# Patient Record
Sex: FEMALE | Race: WHITE
[De-identification: names, ages, dates, MRNs, and addresses within clinical notes are randomized per-mention and may not be internally consistent; named-entity substitution may affect disease eponyms.]

---

## 2019-10-03 ENCOUNTER — HOSPITAL ENCOUNTER (INPATIENT)
Dept: HOSPITAL 7 - FB.ED | Age: 84
LOS: 5 days | Discharge: NURSING, REHAB OR LONG-TERM CARE | DRG: 871 | End: 2019-10-08
Attending: FAMILY MEDICINE | Admitting: FAMILY MEDICINE
Payer: MEDICARE

## 2019-10-03 DIAGNOSIS — R55: ICD-10-CM

## 2019-10-03 DIAGNOSIS — Z95.2: ICD-10-CM

## 2019-10-03 DIAGNOSIS — N17.9: ICD-10-CM

## 2019-10-03 DIAGNOSIS — I38: ICD-10-CM

## 2019-10-03 DIAGNOSIS — M19.90: ICD-10-CM

## 2019-10-03 DIAGNOSIS — Z95.1: ICD-10-CM

## 2019-10-03 DIAGNOSIS — R79.89: ICD-10-CM

## 2019-10-03 DIAGNOSIS — I25.810: ICD-10-CM

## 2019-10-03 DIAGNOSIS — G47.33: ICD-10-CM

## 2019-10-03 DIAGNOSIS — M25.521: ICD-10-CM

## 2019-10-03 DIAGNOSIS — I48.0: ICD-10-CM

## 2019-10-03 DIAGNOSIS — B95.61: ICD-10-CM

## 2019-10-03 DIAGNOSIS — Z96.651: ICD-10-CM

## 2019-10-03 DIAGNOSIS — J18.9: ICD-10-CM

## 2019-10-03 DIAGNOSIS — I48.91: ICD-10-CM

## 2019-10-03 DIAGNOSIS — Z98.49: ICD-10-CM

## 2019-10-03 DIAGNOSIS — A41.9: Primary | ICD-10-CM

## 2019-10-03 DIAGNOSIS — I50.21: ICD-10-CM

## 2019-10-03 DIAGNOSIS — M25.562: ICD-10-CM

## 2019-10-03 DIAGNOSIS — Z79.01: ICD-10-CM

## 2019-10-03 DIAGNOSIS — Z79.82: ICD-10-CM

## 2019-10-03 DIAGNOSIS — Z79.899: ICD-10-CM

## 2019-10-03 DIAGNOSIS — I11.0: ICD-10-CM

## 2019-10-03 PROCEDURE — 83880 ASSAY OF NATRIURETIC PEPTIDE: CPT

## 2019-10-03 PROCEDURE — 80053 COMPREHEN METABOLIC PANEL: CPT

## 2019-10-03 PROCEDURE — 71045 X-RAY EXAM CHEST 1 VIEW: CPT

## 2019-10-03 PROCEDURE — 83605 ASSAY OF LACTIC ACID: CPT

## 2019-10-03 PROCEDURE — 93005 ELECTROCARDIOGRAM TRACING: CPT

## 2019-10-03 PROCEDURE — 86140 C-REACTIVE PROTEIN: CPT

## 2019-10-03 PROCEDURE — 73080 X-RAY EXAM OF ELBOW: CPT

## 2019-10-03 PROCEDURE — 36415 COLL VENOUS BLD VENIPUNCTURE: CPT

## 2019-10-03 PROCEDURE — 96361 HYDRATE IV INFUSION ADD-ON: CPT

## 2019-10-03 PROCEDURE — 85025 COMPLETE CBC W/AUTO DIFF WBC: CPT

## 2019-10-03 PROCEDURE — 84484 ASSAY OF TROPONIN QUANT: CPT

## 2019-10-03 PROCEDURE — 85610 PROTHROMBIN TIME: CPT

## 2019-10-03 PROCEDURE — 71046 X-RAY EXAM CHEST 2 VIEWS: CPT

## 2019-10-03 PROCEDURE — 99285 EMERGENCY DEPT VISIT HI MDM: CPT

## 2019-10-03 PROCEDURE — 70450 CT HEAD/BRAIN W/O DYE: CPT

## 2019-10-03 NOTE — EDM.PDOC
ED HPI GENERAL MEDICAL PROBLEM





- General


Chief Complaint: General


Stated Complaint: FOUND ON FLOOR


Time Seen by Provider: 10/03/19 16:00


Source of Information: Reports: Patient, Old Records


History Limitations: Reports: No Limitations





- History of Present Illness


INITIAL COMMENTS - FREE TEXT/NARRATIVE: 





Zara comes into [facility] ED by EMS after being found on the bathroom floor by 

relative about an hour ago. She missed a cardiology appt at 10:30 am today. 

Zara is uncertain about how events occurred except to say that she remembers 

getting out of bed this am and does not believe she fell. She does not endorse 

any headache, lt headiness, or chest pain. She does have a painful R elbow 

present the past few days, and was seen recently for L leg pain, doppler venous 

study negative for DVT. She was prescribed Tramadol 50 mg for relief, but 

daughter suspects she took 2 tabs for pain relief. She lives alone in [facility]. 





- Related Data


 Allergies











Allergy/AdvReac Type Severity Reaction Status Date / Time


 


No Known Allergies Allergy   Verified 09/19/13 09:14











Home Meds: 


 Home Meds





Aspirin [Adult Low Dose Aspirin EC] 81 mg PO BEDTIME 06/29/14 [History]


Ezetimibe [Zetia] 10 mg PO DAILY 06/29/14 [History]


PARoxetine [Paxil] 40 mg PO DAILY 06/29/14 [History]


atorvaSTATin [Lipitor] 60 mg PO BEDTIME 06/29/14 [History]


Furosemide [Lasix] 40 mg PO DAILY 07/01/14 [History]


Warfarin Sodium [Jantoven] 1 mg PO DAILY 10/03/19 [History]


hydroCHLOROthiazide [Hydrochlorothiazide] 10 mg PO DAILY 10/03/19 [History]


traMADol [Ultram] 50 mg PO Q8HR PRN 10/03/19 [History]











ED ROS GENERAL





- Review of Systems


Review Of Systems: See Below


Constitutional: Reports: No Symptoms


HEENT: Reports: No Symptoms


Respiratory: Reports: No Symptoms


Cardiovascular: Reports: No Symptoms


Endocrine: Reports: No Symptoms


GI/Abdominal: Reports: No Symptoms


: Reports: No Symptoms


Musculoskeletal: Reports: Arm Pain (R elbow), Leg Pain (L calf and improved on 

pain meds)


Skin: Reports: No Symptoms


Neurological: Reports: Syncope (PMH of sycope)


Psychiatric: Reports: No Symptoms


Hematologic/Lymphatic: Reports: No Symptoms


Immunologic: Reports: No Symptoms





ED EXAM, GENERAL





- Physical Exam


Exam: See Below


Exam Limited By: No Limitations


General Appearance: Alert, WD/WN, No Apparent Distress


Eye Exam: Bilateral Eye: EOMI, Normal Inspection, PERRL


Ears: Normal External Exam


Nose: Normal Inspection


Throat/Mouth: Normal Inspection, Normal Oropharynx, Normal Voice


Head: Atraumatic, Normocephalic


Neck: Normal Inspection, Supple, Non-Tender, Full Range of Motion


Respiratory/Chest: No Respiratory Distress, No Accessory Muscle Use, Chest Non-

Tender, Decreased Breath Sounds (bases), Crackles


Cardiovascular: Normal Peripheral Pulses, Irregularly Irregular, Other (opening 

'click' aortic valve)


GI/Abdominal: Normal Bowel Sounds, Soft, Non-Tender, No Organomegaly, No 

Distention, No Mass


 (Female) Exam: Deferred


Rectal (Female) Exam: Deferred


Back Exam: Normal Inspection, Full Range of Motion


Extremities: Arm Pain (R elbow: tenderness of distal biceps extending to 

insertion with forearm), Leg Pain (L leg, mild), Limited Range of Motion (R 

forearm)


Neurological: Alert, Oriented, CN II-XII Intact, Normal Cognition, No Motor/

Sensory Deficits


Psychiatric: Normal Affect, Normal Mood


Skin Exam: Warm, Dry, Intact, Normal Color, No Rash, Other (edema)


Lymphatic: No Adenopathy





Course





- Vital Signs


Text/Narrative:: 





Following assessment in the ED, I started an IV LUE TKO, and provided 

supplemental 02 for SOB. A screening ekg noted AF, old, no other changes 

apparent.  The CBC noted: Hgb 11.0 gm, WBC 11,900, plts normal; L shift noted; 

CMP noted Cr 2.9, BUN 80, BNP 12,007; chest x ray noting extensive R middle 

lobe infiltrates; some cardiomegaly; Head CT non contrast: stable, no new 

findings; R elbow: no fx seen; a pneumonia is suspected, with pre-renal 

azotemia. Case discussed with daughter, and she will be admitted as IP. 


Last Recorded V/S: 


 Last Vital Signs











Temp  36.7 C   10/03/19 15:41


 


Pulse  111 H  10/03/19 15:41


 


Resp  23 H  10/03/19 15:41


 


BP  103/65   10/03/19 15:41


 


Pulse Ox  90 L  10/03/19 15:41














- Orders/Labs/Meds


Orders: 


 Active Orders 24 hr











 Category Date Time Status


 


 Chest 1V Frontal [CR] Stat Exams  10/03/19 16:13 Taken


 


 Chest 2V [CR] Stat Exams  10/03/19 17:09 Taken


 


 Elbow Min 3V Rt [CR] Stat Exams  10/03/19 16:13 Taken


 


 Head wo Cont [CT] Urgent Exams  10/03/19 16:25 Taken


 


 CRP [C-REACTIVE PROTEIN] [CHEM] Stat Lab  10/03/19 16:15 Received


 


 LACTIC ACID [CHEM] Stat Lab  10/03/19 16:15 Received


 


 UA W/MICROSCOPIC [URIN] Stat Lab  10/03/19 16:13 Ordered


 


 Sodium Chloride 0.9% [Saline Flush] Med  10/03/19 16:17 Active





 10 ml FLUSH ASDIRECTED PRN   


 


 Peripheral IV Insertion Adult [OM.PC] Routine Oth  10/03/19 16:17 Ordered


 


 EKG 12 Lead [EK] Routine Ther  10/03/19 16:13 Ordered








 Medication Orders





Sodium Chloride (Saline Flush)  10 ml FLUSH ASDIRECTED PRN


   PRN Reason: Keep Vein Open








Labs: 


 Laboratory Tests











  10/03/19 10/03/19 10/03/19 Range/Units





  16:15 16:15 16:15 


 


WBC  11.9    (4.5-12.0)  X10-3/uL


 


RBC  3.59    (3.23-5.20)  x10(6)uL


 


Hgb  11.0 L    (11.5-15.5)  g/dL


 


Hct  33.2    (30.0-51.3)  %


 


MCV  92.5    (80-96)  fL


 


MCH  30.7    (27.7-33.6)  pg


 


MCHC  33.2    (32.2-35.4)  g/dL


 


RDW  15.2    (11.5-15.5)  %


 


Plt Count  254    (125-369)  X10(3)uL


 


MPV  8.9    (7.4-10.4)  fL


 


Add Manual Diff  Yes    


 


Neutrophils % (Manual)  74    (46-82)  %


 


Band Neutrophils %  9 H    (0-6)  %


 


Lymphocytes % (Manual)  12 L    (13-37)  %


 


Monocytes % (Manual)  5    (4-12)  %


 


PT   30.4 H   (8.7-11.1)  


 


INR   3.17 H   (0.89-1.13)  


 


Sodium    136  (135-145)  mmol/L


 


Potassium    4.0  (3.5-5.3)  mmol/L


 


Chloride    98 L  (100-110)  mmol/L


 


Carbon Dioxide    25  (21-32)  mmol/L


 


BUN    80 H  (7-18)  mg/dL


 


Creatinine    2.9 H*  (0.55-1.02)  mg/dL


 


Est Cr Clr Drug Dosing    TNP  


 


Estimated GFR (MDRD)    16 L  (>60)  


 


BUN/Creatinine Ratio    27.6 H  (9-20)  


 


Glucose    111  ()  mg/dL


 


Calcium    9.7  (8.6-10.2)  mg/dL


 


Total Bilirubin    0.6  (0.1-1.3)  mg/dL


 


AST    122 H  (5-25)  IU/L


 


ALT    40 H  (12-36)  U/L


 


Alkaline Phosphatase    86  ()  IU/L


 


Troponin I     (<0.017-0.056)  ng/mL


 


NT-Pro-B Natriuret Pep     (<=450)  pg/mL


 


Total Protein    7.3  (6.0-8.0)  g/dL


 


Albumin    2.5 L  (3.2-4.6)  g/dL


 


Globulin    4.8  g/dL


 


Albumin/Globulin Ratio    0.5  














  10/03/19 10/03/19 Range/Units





  16:15 16:15 


 


WBC    (4.5-12.0)  X10-3/uL


 


RBC    (3.23-5.20)  x10(6)uL


 


Hgb    (11.5-15.5)  g/dL


 


Hct    (30.0-51.3)  %


 


MCV    (80-96)  fL


 


MCH    (27.7-33.6)  pg


 


MCHC    (32.2-35.4)  g/dL


 


RDW    (11.5-15.5)  %


 


Plt Count    (125-369)  X10(3)uL


 


MPV    (7.4-10.4)  fL


 


Add Manual Diff    


 


Neutrophils % (Manual)    (46-82)  %


 


Band Neutrophils %    (0-6)  %


 


Lymphocytes % (Manual)    (13-37)  %


 


Monocytes % (Manual)    (4-12)  %


 


PT    (8.7-11.1)  


 


INR    (0.89-1.13)  


 


Sodium    (135-145)  mmol/L


 


Potassium    (3.5-5.3)  mmol/L


 


Chloride    (100-110)  mmol/L


 


Carbon Dioxide    (21-32)  mmol/L


 


BUN    (7-18)  mg/dL


 


Creatinine    (0.55-1.02)  mg/dL


 


Est Cr Clr Drug Dosing    


 


Estimated GFR (MDRD)    (>60)  


 


BUN/Creatinine Ratio    (9-20)  


 


Glucose    ()  mg/dL


 


Calcium    (8.6-10.2)  mg/dL


 


Total Bilirubin    (0.1-1.3)  mg/dL


 


AST    (5-25)  IU/L


 


ALT    (12-36)  U/L


 


Alkaline Phosphatase    ()  IU/L


 


Troponin I  0.034   (<0.017-0.056)  ng/mL


 


NT-Pro-B Natriuret Pep   67138 H*  (<=450)  pg/mL


 


Total Protein    (6.0-8.0)  g/dL


 


Albumin    (3.2-4.6)  g/dL


 


Globulin    g/dL


 


Albumin/Globulin Ratio    











Meds: 


Medications











Generic Name Dose Route Start Last Admin





  Trade Name Freq  PRN Reason Stop Dose Admin


 


Sodium Chloride  10 ml  10/03/19 16:17  





  Saline Flush  FLUSH   





  ASDIRECTED PRN   





  Keep Vein Open   





     





     





     














Discontinued Medications














Generic Name Dose Route Start Last Admin





  Trade Name Freq  PRN Reason Stop Dose Admin


 


Sodium Chloride  1,000 mls @ 500 mls/hr  10/03/19 16:30  10/03/19 16:00





  Normal Saline  IV   500 mls/hr





  ASDIRECTED GEETHA   Administration





     





     





     





     














Departure





- Departure


Time of Disposition: 17:42


Disposition: Admitted As Inpatient 68


Condition: Fair


Clinical Impression: 


 Pneumonia, Prerenal azotemia








- Discharge Information


*PRESCRIPTION DRUG MONITORING PROGRAM REVIEWED*: Not Applicable


*COPY OF PRESCRIPTION DRUG MONITORING REPORT IN PATIENT CHRISTINA: Not Applicable


Referrals: 


Khadijah Bianchi NP [Primary Care Provider] - 


Forms:  ED Department Discharge





- Problem List & Annotations


(1) Pneumonia


SNOMED Code(s): 065289546


   Code(s): J18.9 - PNEUMONIA, UNSPECIFIED ORGANISM   Status: Acute   Current 

Visit: Yes   Annotation/Comment:: Admit, start antibx, provide supplemental 02 

  





(2) Prerenal azotemia


SNOMED Code(s): 279573442


   Code(s): R79.89 - OTHER SPECIFIED ABNORMAL FINDINGS OF BLOOD CHEMISTRY   

Status: Acute   Current Visit: Yes   Annotation/Comment:: Rehydration with NS. 

   





- Problem List Review


Problem List Initiated/Reviewed/Updated: Yes





- My Orders


Last 24 Hours: 


My Active Orders





10/03/19 16:13


Chest 1V Frontal [CR] Stat 


Elbow Min 3V Rt [CR] Stat 


UA W/MICROSCOPIC [URIN] Stat 


EKG 12 Lead [EK] Routine 





10/03/19 16:15


CRP [C-REACTIVE PROTEIN] [CHEM] Stat 


LACTIC ACID [CHEM] Stat 





10/03/19 16:17


Sodium Chloride 0.9% [Saline Flush]   10 ml FLUSH ASDIRECTED PRN 


Peripheral IV Insertion Adult [OM.PC] Routine 





10/03/19 16:25


Head wo Cont [CT] Urgent 





10/03/19 17:09


Chest 2V [CR] Stat 














- Assessment/Plan


Last 24 Hours: 


My Active Orders





10/03/19 16:13


Chest 1V Frontal [CR] Stat 


Elbow Min 3V Rt [CR] Stat 


UA W/MICROSCOPIC [URIN] Stat 


EKG 12 Lead [EK] Routine 





10/03/19 16:15


CRP [C-REACTIVE PROTEIN] [CHEM] Stat 


LACTIC ACID [CHEM] Stat 





10/03/19 16:17


Sodium Chloride 0.9% [Saline Flush]   10 ml FLUSH ASDIRECTED PRN 


Peripheral IV Insertion Adult [OM.PC] Routine 





10/03/19 16:25


Head wo Cont [CT] Urgent 





10/03/19 17:09


Chest 2V [CR] Stat 











Plan: 





Hospitalist to see in the am.

## 2019-10-04 RX ADMIN — OMEGA-3 FATTY ACIDS CAP 1000 MG SCH GM: 1000 CAP at 09:54

## 2019-10-04 RX ADMIN — CARBOXYMETHYLCELLULOSE SODIUM SCH DROP: 10 GEL OPHTHALMIC at 21:28

## 2019-10-04 NOTE — CR
INDICATION:  Found on floor, question syncopal episode.



CHEST:  PA and lateral views of the chest were obtained 10/03/19 at 1702 hours 
and compared with a single view examination from 1631 hours and previous CT of 
the chest from 06/04/14. 



Consolidating infiltration is present at the middle lobe, blurring the left 
cardiac margin, with blunting of the costophrenic angle and posterior sulcus on 
the right.  This appearance is compatible with pneumonia and pleuritis but 
should be correlated clinically.



There is some linear atelectasis and possibly fibrosis at the left lung base 
with some minimal pleural reaction, which could be fibrotic in nature.



The heart was slightly enlarged with aortic valve replacement.  The aorta is 
tortuous and calcified in the arch and descending portion.  Median sternotomy 
with aortic valve replacement is again noted.



Overlying EKG leads are noted.



Diminished bone density is compatible with osteoporosis. 



AP diameter prominence, flattening of diaphragm leaves, and hyperaeration all 
suggest COPD.



IMPRESSION:

1.  Pneumonia and pleuritis right middle lobe, somewhat patchy consolidation is 
present.

2.  Minimal pneumonia and pleuritis at the left lung base versus fibrosis.

3.  ASHD, cardiomegaly, aortic valve replacement.

4.  Osteoporosis.

5.  COPD.



Report was given in person to Dr. Park immediately after the examination was 
completed on 10/03/19.

MTDD

## 2019-10-04 NOTE — CT
INDICATION:  Knee pain.  Surgery 1 1/2 years ago.



CT KNEES FOR LEFT KNEE:  Spiral 2.5 mm axial sections were obtained through the 
knees for the left knee.  Coronal and sagittal reconstructions were obtained, 10
/04/19 - no comparisons.  Total exam DLP = 1,654.46 mGy-cm.

 

A total knee arthroplasty is noted on the symptomatic left knee, which produces 
hard beam artifact, which does limit detail somewhat.  



The total knee arthroplasty does appear to be in good position and alignment 
without a definite complicating process, such as loosening.  However, there is 
evidence of a left knee joint effusion of small size, etiology indeterminate.  
This should be correlated clinically, as the possibility of septic arthritis 
cannot be excluded.



Degenerative changes are noted at the right knee joint with loss of joint space 
at the patellofemoral joint, hypertrophic changes, and some subchondral cystic 
changes noted at the femorotibial joint.



Incidentally noted were calcifications in the arteries posteriorly bilaterally.



IMPRESSION:

1.  TKA on the left appears to be in satisfactory position and alignment with a 
small knee joint effusion noted - no other definite complicating process 
suggested.  Study is somewhat limited by the hard beam artifact of the metallic 
components of the TKA.

2.  Osteoarthritis right knee joint.

3.  ASD.

MTDD

## 2019-10-04 NOTE — CR
INDICATION:  Found on floor, question syncopal episode.



RIGHT ELBOW:  Four images in three projections of the right elbow revealed mild 
osteoarthritis in the medial elbow joint compartment.



Slight demineralization may represent osteoporosis but should be correlated 
clinically.



A fracture, dislocation, joint effusion, or other significant bone or joint 
abnormality was not identified. 



IMPRESSION:

1.  Mild medial compartment osteoarthritis.

2.  Question minimal demineralization.

MTDD

## 2019-10-04 NOTE — CT
INDICATION:  Pain elbow/forearm.



CT RIGHT FOREARM:  Spiral 1.25 mm axial sections were obtained through the 
forearm times two, due to the first being inadequately positioned apparently 
due to patients inability to position satisfactorily.  Sagittal and coronal 
reconstructions were obtained, 10/04/19 - no comparisons.  Total exam DLP = 1,
851.52 mGy-cm.

 

Mild degenerative changes are noted at the medial elbow joint compartment and 
at the radiocarpal joint, mostly at the radial lunate joint.  



No evidence of an acute fracture or dislocation was identified.  



There appears to be an elbow joint effusion, which could be on the basis of 
soft tissue injury or occult fracture; however, no fracture site is seen at 
this time.  A followup study with plain films in 2-3 weeks may be helpful for 
further evaluation.  MRI of the elbow to evaluate for soft tissue injury may 
also be helpful, depending upon clinical correlation.



IMPRESSION:  No visualized fracture or dislocation.  However, there is evidence 
of an elbow joint effusion, which should be correlated clinically, as a soft 
tissue injury could be present.  MRI may be helpful in that regard, depending 
upon clinical necessity.

HIWOTD

## 2019-10-04 NOTE — CT
INDICATION:  Found on floor, question syncopal episode.



CT HEAD WITHOUT CONTRAST:  Spiral 3.75 mm axial sections were obtained through 
the brain 10/03/19 and compared with 06/29/14.  Total exam DLP = 1,373.85 mGy-
cm.



Calcifications are noted in the left vertebral and internal carotid arteries, 
as previously.



The orbits appear to be grossly intact.  



There is thickening of the linings of the maxillary antra, more prominently at 
the left than right, which could be on the basis of sinusitis.  An air fluid 
level in the right maxillary antrum raising question of acute maxillary 
sinusitis.  Frothy appearing fluid is suggested in the left maxillary sinus, 
also suggesting acute sinusitis.  



There is noted thickening of the linings of several ethmoidal air cells and 
right sphenoidal air cell, as well as at the bases of the frontal air cells, 
suggesting additional sinusitis.



The mastoid air cells appear to be fairly well-aerated.



Degenerative changes are noted at the atlantoodontoid joint.  There is a small 
bony fragment left lateral to the odontoid, which was present previously and 
could represent a tiny avulsion chip fracture fragment that did not unite from 
a previous injury off the atlas at the border of the atlantoodontoid joint. 



No cranial fracture site was identified.  There is again noted a low density - 
cystic appearing mass in the left posterior parietal bone.  This appears 
unchanged and is likely not of clinical significance.  



No shift of midline structures was identified.



Slightly progressive atrophy is suggested centrally and cortical.  



Probable lacunar infarct is noted at the genu of the internal capsule on the 
left, present previously, associated with some minimal calcification.



Slightly progressive white matter changes are noted, compatible with mild 
microvascular disease.  Other cause of leukoencephalopathy cannot be excluded.  



No finding to strongly suggest an acute intracranial abnormality was identified 
- no bleeding site or hematoma was seen.  



IMPRESSION:  

1.  No definite acute intracranial abnormality.

2.  Cerebrovascular disease with mild microvascular disease likely.

3.  Paranasal sinusitis suggested.  

4.  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 
stable.

5.  Mildly progressive generalized atrophy.



Report was given in person to Dr. Park at approximately 1700 hours on 10/03/19.

[facility]

## 2019-10-04 NOTE — PCM.HP.2
H&P History of Present Illness





- General


Date of Service: 10/04/19


Admit Problem/Dx: 


 Admission Diagnosis/Problem





Admission Diagnosis/Problem      Pneumonia








Source of Information: Patient, Family


History Limitations: Reports: No Limitations





- History of Present Illness


Initial Comments - Free Text/Narative: 


Elif is an 84-year-old female that came in because of pain in the right knee 

leg and right. She was found on the for a period of time unspecified. She 

missed a cardiology appointment,therefore the family called to check on her. 

She states that she took tramadol(perhaps 2 tabs) because of pain,and she just 

laid on the floor to rest but  was unable to get up. Zara endorses difficulty 

walking, right elbow pain, right knee pain in the posterior aspect, has been 

for about a week. She had knee replacement on that side one and half years ago. 

She has a history of congestive heart failure, coronary artery disease, 

hypertension that are all previously under control.





- Related Data


Allergies/Adverse Reactions: 


 Allergies











Allergy/AdvReac Type Severity Reaction Status Date / Time


 


No Known Allergies Allergy   Verified 09/19/13 09:14











Home Medications: 


 Home Meds





Furosemide [Lasix] 20 mg PO DAILY 07/01/14 [History]


hydroCHLOROthiazide [Hydrochlorothiazide] 25 mg PO DAILY 10/03/19 [History]


traMADol [Ultram] 50 mg PO Q8H PRN 10/03/19 [History]


Acetaminophen [Acetaminophen Extra Strength] 1,000 mg PO WITHDINNER 10/04/19 [

History]


Acetaminophen/Diphenhydramine [Tylenol Pm Ex-Strength Caplet] 1 tab PO BEDTIME 

10/04/19 [History]


Aspirin 81 mg PO DAILY 10/04/19 [History]


Carboxymethylcellulose Sodium [Refresh Liquigel 1% Ophth Soln] 1 drop EYEBOTH 

BEDTIME 10/04/19 [History]


Enalapril [Vasotec] 1.25 mg PO DAILY 10/04/19 [History]


Ezetimibe [Zetia] 10 mg PO DAILY 10/04/19 [History]


Ibuprofen 200 mg PO TID PRN 10/04/19 [History]


Loperamide [Imodium AD] 2 mg PO ASDIRECTED PRN 10/04/19 [History]


Metoprolol Succinate [Toprol XL 50mg] 50 mg PO DAILY 10/04/19 [History]


Multivitamin [Daily Multiple Vitamin] 0.5 each PO BID 10/04/19 [History]


Omega-3 Fatty Acids/Fish Oil [Fish Oil 1,200 mg Softgel] 1 cap PO DAILY 10/04/

19 [History]


PARoxetine HCl [Paxil] 40 mg PO DAILY 10/04/19 [History]


Warfarin Sodium [Jantoven] 5 mg PO DAILY@1600 10/04/19 [History]


atorvaSTATin [Lipitor] 60 mg PO BEDTIME 10/04/19 [History]











Past Medical History


HEENT History: Reports: Cataract


Cardiovascular History: Reports: Bypass, Heart Failure, Heart Valve Replacement


Respiratory History: Reports: Sleep Apnea, Other (See Below)


Other Respiratory History: Put powder in both lungs for fluid, from CHF.  Has a 

frequent cough.


OB/GYN History: Reports: Pregnancy


Musculoskeletal History: Reports: Arthritis


Dermatologic History: Reports: Other (See Below)


Other Dermatologic History: Yellow nail syndrome.





- Infectious Disease History


Infectious Disease History: Reports: Chicken Pox, MRSA, Mumps, Other (See Below)


Other Infectious Disease History: History MRSA, ingrown hair, buttock area, 

hospitalized.  Patient states she had measles, unsure if Rubella or Rubeola.





- Past Surgical History


HEENT Surgical History: Reports: Cataract Surgery


Musculoskeletal Surgical History: Reports: Knee Replacement, Shoulder Surgery





Social & Family History





- Family History


Family Medical History: Noncontributory





- Tobacco Use


Smoking Status *Q: Never Smoker


Second Hand Smoke Exposure: No





- Caffeine Use


Caffeine Use: Reports: Coffee


Caffeine Use Comment: de-caf coffee





- Alcohol Use


Days Per Week of Alcohol Use: 1


Number of Drinks Per Day: 2


Total Drinks Per Week: 2





- Recreational Drug Use


Recreational Drug Use: No





H&P Review of Systems





- Review of Systems:


Review Of Systems: ROS reveals no pertinent complaints other than HPI.





Exam





- Exam


Exam: See Below





- Vital Signs


Vital Signs: 


 Last Vital Signs











Temp  98 F   10/03/19 22:35


 


Pulse  111 H  10/04/19 04:00


 


Resp  18   10/04/19 04:00


 


BP  115/69   10/04/19 04:00


 


Pulse Ox  99   10/04/19 04:00











Weight: 85.457 kg





- Exam


Quality Assessment: Supplemental Oxygen


General: Alert, Oriented, 4


HEENT: PERRLA, Hearing Intact, Mucosa Moist & Pink, Nares Patent, Normal Nasal 

Septum, Posterior Pharynx Clear, Conjunctiva Clear, EOMI, EACs Clear, TMs Clear


Neck: Supple, Trachea Midline, 2


Lungs: Crackles


Cardiovascular: Irregular Rhythm, Tachycardia


GI/Abdominal Exam: Normal Bowel Sounds, Soft, Non-Tender, No Organomegaly, No 

Distention, No Abnormal Bruit, No Mass, Pelvis Stable


 (Female) Exam: Deferred


Rectal (Female) Exam: Deferred


Back Exam: Normal Inspection, Full Range of Motion, NT


Extremities: No Pedal Edema, Leg Pain, Other (tender posteriour knee)


Skin: Warm, Dry, Intact


Neurological: Cranial Nerves Intact


Neuro Extensive - Mental Status: Alert, Oriented x3, Normal Mood/Affect, Normal 

Cognition


Neuro Extensive - Motor, Sensory, Reflexes: CN II-XII Intact, Normal Gait, 

Normal Reflexes


Psychiatric: Alert, Normal Affect, Normal Mood





- Patient Data


Lab Results Last 24 hrs: 


 Laboratory Results - last 24 hr











  10/03/19 10/03/19 10/03/19 Range/Units





  16:15 16:15 16:15 


 


WBC  11.9    (4.5-12.0)  X10-3/uL


 


RBC  3.59    (3.23-5.20)  x10(6)uL


 


Hgb  11.0 L    (11.5-15.5)  g/dL


 


Hct  33.2    (30.0-51.3)  %


 


MCV  92.5    (80-96)  fL


 


MCH  30.7    (27.7-33.6)  pg


 


MCHC  33.2    (32.2-35.4)  g/dL


 


RDW  15.2    (11.5-15.5)  %


 


Plt Count  254    (125-369)  X10(3)uL


 


MPV  8.9    (7.4-10.4)  fL


 


Add Manual Diff  Yes    


 


Neutrophils % (Manual)  74    (46-82)  %


 


Band Neutrophils %  9 H    (0-6)  %


 


Lymphocytes % (Manual)  12 L    (13-37)  %


 


Monocytes % (Manual)  5    (4-12)  %


 


PT   30.4 H   (8.7-11.1)  


 


INR   3.17 H   (0.89-1.13)  


 


Sodium    136  (135-145)  mmol/L


 


Potassium    4.0  (3.5-5.3)  mmol/L


 


Chloride    98 L  (100-110)  mmol/L


 


Carbon Dioxide    25  (21-32)  mmol/L


 


BUN    80 H  (7-18)  mg/dL


 


Creatinine    2.9 H*  (0.55-1.02)  mg/dL


 


Est Cr Clr Drug Dosing    TNP  


 


Estimated GFR (MDRD)    16 L  (>60)  


 


BUN/Creatinine Ratio    27.6 H  (9-20)  


 


Glucose    111  ()  mg/dL


 


Lactic Acid     (0.4-2.2)  mmol/L


 


Calcium    9.7  (8.6-10.2)  mg/dL


 


Total Bilirubin    0.6  (0.1-1.3)  mg/dL


 


AST    122 H  (5-25)  IU/L


 


ALT    40 H  (12-36)  U/L


 


Alkaline Phosphatase    86  ()  IU/L


 


Troponin I     (<0.017-0.056)  ng/mL


 


C-Reactive Protein     (0.5-0.9)  mg/dL


 


NT-Pro-B Natriuret Pep     (<=450)  pg/mL


 


Total Protein    7.3  (6.0-8.0)  g/dL


 


Albumin    2.5 L  (3.2-4.6)  g/dL


 


Globulin    4.8  g/dL


 


Albumin/Globulin Ratio    0.5  


 


Urine Color     (YELLOW)  


 


Urine Appearance     (CLEAR)  


 


Urine pH     (5.0-6.5)  


 


Ur Specific Gravity     (1.010-1.025)  


 


Urine Protein     (NEGATIVE)  mg/dL


 


Urine Glucose (UA)     (NORMAL)  mg/dL


 


Urine Ketones     (NEGATIVE)  mg/dL


 


Urine Occult Blood     (NEGATIVE)  


 


Urine Nitrite     (NEGATIVE)  


 


Urine Bilirubin     (NEGATIVE)  


 


Urine Urobilinogen     (NEGATIVE)  mg/dL


 


Ur Leukocyte Esterase     (NEGATIVE)  


 


Urine RBC     (0-5)  


 


Urine WBC     (0-5)  


 


Ur Squamous Epith Cells     (NS,R,O)  


 


Urine Bacteria     (NS)  


 


Coarse Granular Casts     (NS)  














  10/03/19 10/03/19 10/03/19 Range/Units





  16:15 16:15 16:15 


 


WBC     (4.5-12.0)  X10-3/uL


 


RBC     (3.23-5.20)  x10(6)uL


 


Hgb     (11.5-15.5)  g/dL


 


Hct     (30.0-51.3)  %


 


MCV     (80-96)  fL


 


MCH     (27.7-33.6)  pg


 


MCHC     (32.2-35.4)  g/dL


 


RDW     (11.5-15.5)  %


 


Plt Count     (125-369)  X10(3)uL


 


MPV     (7.4-10.4)  fL


 


Add Manual Diff     


 


Neutrophils % (Manual)     (46-82)  %


 


Band Neutrophils %     (0-6)  %


 


Lymphocytes % (Manual)     (13-37)  %


 


Monocytes % (Manual)     (4-12)  %


 


PT     (8.7-11.1)  


 


INR     (0.89-1.13)  


 


Sodium     (135-145)  mmol/L


 


Potassium     (3.5-5.3)  mmol/L


 


Chloride     (100-110)  mmol/L


 


Carbon Dioxide     (21-32)  mmol/L


 


BUN     (7-18)  mg/dL


 


Creatinine     (0.55-1.02)  mg/dL


 


Est Cr Clr Drug Dosing     


 


Estimated GFR (MDRD)     (>60)  


 


BUN/Creatinine Ratio     (9-20)  


 


Glucose     ()  mg/dL


 


Lactic Acid    1.8  (0.4-2.2)  mmol/L


 


Calcium     (8.6-10.2)  mg/dL


 


Total Bilirubin     (0.1-1.3)  mg/dL


 


AST     (5-25)  IU/L


 


ALT     (12-36)  U/L


 


Alkaline Phosphatase     ()  IU/L


 


Troponin I  0.034    (<0.017-0.056)  ng/mL


 


C-Reactive Protein     (0.5-0.9)  mg/dL


 


NT-Pro-B Natriuret Pep   27922 H*   (<=450)  pg/mL


 


Total Protein     (6.0-8.0)  g/dL


 


Albumin     (3.2-4.6)  g/dL


 


Globulin     g/dL


 


Albumin/Globulin Ratio     


 


Urine Color     (YELLOW)  


 


Urine Appearance     (CLEAR)  


 


Urine pH     (5.0-6.5)  


 


Ur Specific Gravity     (1.010-1.025)  


 


Urine Protein     (NEGATIVE)  mg/dL


 


Urine Glucose (UA)     (NORMAL)  mg/dL


 


Urine Ketones     (NEGATIVE)  mg/dL


 


Urine Occult Blood     (NEGATIVE)  


 


Urine Nitrite     (NEGATIVE)  


 


Urine Bilirubin     (NEGATIVE)  


 


Urine Urobilinogen     (NEGATIVE)  mg/dL


 


Ur Leukocyte Esterase     (NEGATIVE)  


 


Urine RBC     (0-5)  


 


Urine WBC     (0-5)  


 


Ur Squamous Epith Cells     (NS,R,O)  


 


Urine Bacteria     (NS)  


 


Coarse Granular Casts     (NS)  














  10/03/19 10/03/19 10/03/19 Range/Units





  16:15 17:59 23:59 


 


WBC     (4.5-12.0)  X10-3/uL


 


RBC     (3.23-5.20)  x10(6)uL


 


Hgb     (11.5-15.5)  g/dL


 


Hct     (30.0-51.3)  %


 


MCV     (80-96)  fL


 


MCH     (27.7-33.6)  pg


 


MCHC     (32.2-35.4)  g/dL


 


RDW     (11.5-15.5)  %


 


Plt Count     (125-369)  X10(3)uL


 


MPV     (7.4-10.4)  fL


 


Add Manual Diff     


 


Neutrophils % (Manual)     (46-82)  %


 


Band Neutrophils %     (0-6)  %


 


Lymphocytes % (Manual)     (13-37)  %


 


Monocytes % (Manual)     (4-12)  %


 


PT     (8.7-11.1)  


 


INR     (0.89-1.13)  


 


Sodium    135  (135-145)  mmol/L


 


Potassium    3.9  (3.5-5.3)  mmol/L


 


Chloride    99 L  (100-110)  mmol/L


 


Carbon Dioxide    25  (21-32)  mmol/L


 


BUN    83 H  (7-18)  mg/dL


 


Creatinine    2.6 H*  (0.55-1.02)  mg/dL


 


Est Cr Clr Drug Dosing    13.80  


 


Estimated GFR (MDRD)    18 L  (>60)  


 


BUN/Creatinine Ratio    31.9 H  (9-20)  


 


Glucose    118 H  ()  mg/dL


 


Lactic Acid     (0.4-2.2)  mmol/L


 


Calcium    9.3  (8.6-10.2)  mg/dL


 


Total Bilirubin     (0.1-1.3)  mg/dL


 


AST     (5-25)  IU/L


 


ALT     (12-36)  U/L


 


Alkaline Phosphatase     ()  IU/L


 


Troponin I     (<0.017-0.056)  ng/mL


 


C-Reactive Protein  > 12.0 H*    (0.5-0.9)  mg/dL


 


NT-Pro-B Natriuret Pep     (<=450)  pg/mL


 


Total Protein     (6.0-8.0)  g/dL


 


Albumin     (3.2-4.6)  g/dL


 


Globulin     g/dL


 


Albumin/Globulin Ratio     


 


Urine Color   Yellow   (YELLOW)  


 


Urine Appearance   Slightly cloudy   (CLEAR)  


 


Urine pH   5.0   (5.0-6.5)  


 


Ur Specific Gravity   1.020   (1.010-1.025)  


 


Urine Protein   Negative   (NEGATIVE)  mg/dL


 


Urine Glucose (UA)   Normal   (NORMAL)  mg/dL


 


Urine Ketones   Negative   (NEGATIVE)  mg/dL


 


Urine Occult Blood   Large H   (NEGATIVE)  


 


Urine Nitrite   Negative   (NEGATIVE)  


 


Urine Bilirubin   Negative   (NEGATIVE)  


 


Urine Urobilinogen   Normal   (NEGATIVE)  mg/dL


 


Ur Leukocyte Esterase   Negative   (NEGATIVE)  


 


Urine RBC   10-20 H   (0-5)  


 


Urine WBC   0-5   (0-5)  


 


Ur Squamous Epith Cells   Few H   (NS,R,O)  


 


Urine Bacteria   Few H   (NS)  


 


Coarse Granular Casts   Few H   (NS)  














  10/04/19 10/04/19 Range/Units





  06:40 06:40 


 


WBC   11.9  (4.5-12.0)  X10-3/uL


 


RBC   3.49  (3.23-5.20)  x10(6)uL


 


Hgb   10.4 L  (11.5-15.5)  g/dL


 


Hct   32.3  (30.0-51.3)  %


 


MCV   92.4  (80-96)  fL


 


MCH   29.8  (27.7-33.6)  pg


 


MCHC   32.3  (32.2-35.4)  g/dL


 


RDW   15.8 H  (11.5-15.5)  %


 


Plt Count   225  (125-369)  X10(3)uL


 


MPV   8.9  (7.4-10.4)  fL


 


Add Manual Diff   Yes  


 


Neutrophils % (Manual)   82  (46-82)  %


 


Band Neutrophils %   5  (0-6)  %


 


Lymphocytes % (Manual)   5 L  (13-37)  %


 


Monocytes % (Manual)   8  (4-12)  %


 


PT    (8.7-11.1)  


 


INR    (0.89-1.13)  


 


Sodium  136   (135-145)  mmol/L


 


Potassium  4.3   (3.5-5.3)  mmol/L


 


Chloride  100   (100-110)  mmol/L


 


Carbon Dioxide  24   (21-32)  mmol/L


 


BUN  75 H   (7-18)  mg/dL


 


Creatinine  2.2 H*   (0.55-1.02)  mg/dL


 


Est Cr Clr Drug Dosing  16.31   


 


Estimated GFR (MDRD)  21 L   (>60)  


 


BUN/Creatinine Ratio  34.1 H   (9-20)  


 


Glucose  116   ()  mg/dL


 


Lactic Acid    (0.4-2.2)  mmol/L


 


Calcium  9.2   (8.6-10.2)  mg/dL


 


Total Bilirubin    (0.1-1.3)  mg/dL


 


AST    (5-25)  IU/L


 


ALT    (12-36)  U/L


 


Alkaline Phosphatase    ()  IU/L


 


Troponin I    (<0.017-0.056)  ng/mL


 


C-Reactive Protein    (0.5-0.9)  mg/dL


 


NT-Pro-B Natriuret Pep    (<=450)  pg/mL


 


Total Protein    (6.0-8.0)  g/dL


 


Albumin    (3.2-4.6)  g/dL


 


Globulin    g/dL


 


Albumin/Globulin Ratio    


 


Urine Color    (YELLOW)  


 


Urine Appearance    (CLEAR)  


 


Urine pH    (5.0-6.5)  


 


Ur Specific Gravity    (1.010-1.025)  


 


Urine Protein    (NEGATIVE)  mg/dL


 


Urine Glucose (UA)    (NORMAL)  mg/dL


 


Urine Ketones    (NEGATIVE)  mg/dL


 


Urine Occult Blood    (NEGATIVE)  


 


Urine Nitrite    (NEGATIVE)  


 


Urine Bilirubin    (NEGATIVE)  


 


Urine Urobilinogen    (NEGATIVE)  mg/dL


 


Ur Leukocyte Esterase    (NEGATIVE)  


 


Urine RBC    (0-5)  


 


Urine WBC    (0-5)  


 


Ur Squamous Epith Cells    (NS,R,O)  


 


Urine Bacteria    (NS)  


 


Coarse Granular Casts    (NS)  











Result Diagrams: 


 10/04/19 06:40





 10/04/19 06:40





EKG INTERPRETATION


Axis: Normal





- Problem List


(1) Knee pain


SNOMED Code(s): 67499250


   ICD Code: M25.569 - PAIN IN UNSPECIFIED KNEE   Status: Acute   Current Visit

: Yes   


Qualifiers: 


   Chronicity: acute   Laterality: left   Qualified Code(s): M25.562 - Pain in 

left knee   





(2) CAD (coronary artery disease)


SNOMED Code(s): 24137957


   ICD Code: I25.10 - ATHSCL HEART DISEASE OF NATIVE CORONARY ARTERY W/O ANG 

PCTRS   Status: Chronic   Current Visit: Yes   


Qualifiers: 


   Coronary Disease-Associated Artery/Lesion type: bypass graft   Native vs. 

transplanted heart: native heart   Associated angina: without angina   

Qualified Code(s): I25.810 - Atherosclerosis of coronary artery bypass graft(s) 

without angina pectoris   





(3) CHF (congestive heart failure)


SNOMED Code(s): 60423510


   ICD Code: I50.9 - HEART FAILURE, UNSPECIFIED   Status: Acute   Current Visit

: Yes   


Qualifiers: 


   Heart failure type: systolic 





(4) HTN (hypertension)


SNOMED Code(s): 91243543


   ICD Code: I10 - ESSENTIAL (PRIMARY) HYPERTENSION   Status: Chronic   Current 

Visit: Yes   


Qualifiers: 


   Hypertension type: essential hypertension   Qualified Code(s): I10 - 

Essential (primary) hypertension   





(5) Pneumonia


SNOMED Code(s): 881411198


   ICD Code: J18.9 - PNEUMONIA, UNSPECIFIED ORGANISM   Status: Acute   Current 

Visit: Yes   Problem Details: Admit, start antibx, provide supplemental 02   





(6) Prerenal azotemia


SNOMED Code(s): 910279980


   ICD Code: R79.89 - OTHER SPECIFIED ABNORMAL FINDINGS OF BLOOD CHEMISTRY   

Status: Acute   Current Visit: Yes   Problem Details: Rehydration with NS.    





(7) Afib


SNOMED Code(s): 79275763


   ICD Code: I48.91 - UNSPECIFIED ATRIAL FIBRILLATION   Status: Acute   Current 

Visit: Yes   


Qualifiers: 


   Atrial fibrillation type: paroxysmal   Qualified Code(s): I48.0 - Paroxysmal 

atrial fibrillation   





(8) INGRID (obstructive sleep apnea)


SNOMED Code(s): 93881008


   ICD Code: G47.33 - OBSTRUCTIVE SLEEP APNEA (ADULT) (PEDIATRIC)   Status: 

Acute   Current Visit: Yes   





(9) Valvular heart disease


Status: Acute   Current Visit: Yes   





(10) Osteoarthritis


SNOMED Code(s): 493967951


   ICD Code: M19.90 - UNSPECIFIED OSTEOARTHRITIS, UNSPECIFIED SITE   Status: 

Acute   Current Visit: Yes   


Problem List Initiated/Reviewed/Updated: Yes


Orders Last 24hrs: 


 Active Orders 24 hr











 Category Date Time Status


 


 Patient Status [ADT] Routine ADT  10/03/19 17:46 Active


 


 Bedrest Bedside Commode [RC] ASDIRECTED Care  10/03/19 17:46 Active


 


 Height and Weight [RC] UPON Care  10/03/19 17:46 Active


 


 Intake and Output [RC] 06,14,22 Care  10/03/19 17:48 Active


 


 Oxygen Therapy [RC] 00 Care  10/03/19 17:46 Active


 


 Pulse Oximetry [RC] CONTINUOUS Care  10/03/19 17:48 Active


 


 VTE/DVT Education [RC] Per Unit Routine Care  10/03/19 17:46 Active


 


 Vital Signs [RC] 00,04,08,12,16,20 Care  10/03/19 17:46 Active


 


 OT Evaluation and Treatment [CONS] Routine Cons  10/04/19 08:53 Active


 


 PT Evaluation and Treatment [CONS] Routine Cons  10/04/19 08:53 Active


 


 2 Gram Sodium Diet [DIET] Diet  10/03/19 Dinner Active


 


 Chest 1V Frontal [CR] Stat Exams  10/03/19 16:13 Taken


 


 Chest 2V [CR] Stat Exams  10/03/19 17:09 Taken


 


 Elbow Min 3V Rt [CR] Stat Exams  10/03/19 16:13 Taken


 


 Head wo Cont [CT] Urgent Exams  10/03/19 16:25 Taken


 


 BASIC METABOLIC PANEL,BMP [CHEM] AM Lab  10/05/19 05:11 Ordered


 


 CBC WITH AUTO DIFF [HEME] AM Lab  10/05/19 05:11 Ordered


 


 CULTURE BLOOD [BC] Urgent Lab  10/03/19 18:50 Received


 


 CULTURE BLOOD [BC] Urgent Lab  10/03/19 18:55 Received


 


 PRO B-TYPE NATRIUR PEPT,BNPPRO [CHEM] DAILY Lab  10/05/19 05:11 Ordered


 


 Acetaminophen [Tylenol Extra Strength] Med  10/04/19 18:00 Active





 1,000 mg PO WITHDINNER   


 


 Acetaminophen [Tylenol] Med  10/03/19 17:46 Active





 650 mg PO Q4H PRN   


 


 Aspirin Med  10/04/19 09:00 Active





 81 mg PO DAILY   


 


 Azithromycin [Zithromax] 500 mg Med  10/03/19 18:00 Active





 Sodium Chloride 0.9% [Normal Saline (AdvBag)] 250 ml   





 IV Q24H   


 


 Carboxymethylcellulose Sodium [Refresh Liquigel 1%] Med  10/04/19 21:00 Active





 0 ml EYEBOTH BEDTIME   


 


 Ezetimibe [Zetia] Med  10/04/19 09:00 Active





 10 mg PO DAILY   


 


 Fish Oil/Omega-3 Fatty Acids [Fish Oil] Med  10/04/19 09:15 Active





 1 gm PO DAILY   


 


 Furosemide [Lasix] Med  10/05/19 09:00 Active





 20 mg PO DAILY   


 


 Loperamide [Imodium] Med  10/04/19 09:15 Active





 2 mg PO ASDIRECTED PRN   


 


 Metoprolol Succinate [Toprol XL] Med  10/04/19 09:00 Active





 50 mg PO DAILY   


 


 Multivitamins [Tab-A-José Antonio] Med  10/04/19 09:00 Ordered





 DOSE tab PO BID   


 


 PARoxetine [Paxil] Med  10/04/19 09:00 Active





 40 mg PO DAILY   


 


 Sodium Chloride 0.9% [Normal Saline] 1,000 ml Med  10/03/19 20:35 Active





 IV ASDIRECTED   


 


 Sodium Chloride 0.9% [Saline Flush] Med  10/03/19 16:17 Active





 10 ml FLUSH ASDIRECTED PRN   


 


 Warfarin [Coumadin] Med  10/04/19 16:00 Active





 5 mg PO DAILY@1600   


 


 atorvaSTATin [Lipitor] Med  10/04/19 21:00 Active





 60 mg PO BEDTIME   


 


 cefTRIAXone [Rocephin] 1 gm Med  10/03/19 18:00 Active





 Sodium Chloride 0.9% [Normal Saline] 50 ml   





 IV Q24H   


 


 traMADol [Ultram] Med  10/04/19 08:56 Active





 50 mg PO Q8H PRN   


 


 Blood Culture x2 Reflex Set [OM.PC] Urgent Oth  10/03/19 17:46 Ordered


 


 Peripheral IV Insertion Adult [OM.PC] Routine Oth  10/03/19 16:17 Ordered


 


 Resuscitation Status Routine Resus Stat  10/03/19 17:46 Ordered


 


 EKG 12 Lead [EK] Routine Ther  10/03/19 16:13 Stop Req








 Medication Orders





Acetaminophen (Tylenol)  650 mg PO Q4H PRN


   PRN Reason: Pain (Mild 1-3)/fever


   Last Admin: 10/03/19 20:40  Dose: 650 mg


Acetaminophen (Tylenol Extra Strength)  1,000 mg PO WITHDINNER [facility]


Artificial Tears (Refresh Liquigel 1%)  0 ml EYEBOTH BEDTIME [facility]


Aspirin (Aspirin)  81 mg PO DAILY [facility]


Atorvastatin Calcium (Lipitor)  60 mg PO BEDTIME [facility]


Ezetimibe (Zetia)  10 mg PO DAILY [facility]


Fish Oil (Fish Oil)  1 gm PO DAILY [facility]


Furosemide (Lasix)  20 mg PO DAILY [facility]


Azithromycin 500 mg/ Sodium (Chloride)  250 mls @ 250 mls/hr IV Q24H [facility]


   Last Admin: 10/03/19 18:53  Dose: 250 mls/hr


Ceftriaxone Sodium 1 gm/ (Sodium Chloride)  50 mls @ 200 mls/hr IV Q24H [facility]


   Last Admin: 10/03/19 18:17  Dose: 200 mls/hr


Sodium Chloride (Normal Saline)  1,000 mls @ 2,000 mls/hr IV ASDIRECTED [facility]


   Last Infusion: 10/04/19 00:25  Dose: 125 mls/hr


   Admin: 10/04/19 00:05  Dose: 2,000 mls/hr


Loperamide HCl (Imodium)  2 mg PO ASDIRECTED PRN


   PRN Reason: LOOSE STOOLS


Metoprolol Succinate (Toprol Xl)  50 mg PO DAILY [facility]


Multivitamins/Minerals/Vitamin C (Tab-A-José Antonio)   tab PO BID [facility]


Paroxetine HCl (Paxil)  40 mg PO DAILY [facility]


Sodium Chloride (Saline Flush)  10 ml FLUSH ASDIRECTED PRN


   PRN Reason: Keep Vein Open


Tramadol HCl (Ultram)  50 mg PO Q8H PRN


   PRN Reason: Pain


Warfarin Sodium (Coumadin)  5 mg PO DAILY@1600 [facility]








Assessment/Plan Comment:: 


The x-ray shows a right-sided infiltrate. She doesn't complain of any fever but 

has had a chronic cough and does needed oxygen overnight. I agree with Rocephin 

and azithromycin for treatment. I've discontinued fluids in fear of setting her 

up for failure(heart). I'll repeat labs, ask physical and occupational therapy 

for evaluation and resume home medications including Coumadin.X ray the right 

shoulder.Elbow apparently negative. The knee knee has a posterior fossa cyst.An 

X ray would be appropriate.





- Mortality Measure


Prognosis:: Good

## 2019-10-05 RX ADMIN — CARBOXYMETHYLCELLULOSE SODIUM SCH DROP: 10 GEL OPHTHALMIC at 21:59

## 2019-10-05 RX ADMIN — OMEGA-3 FATTY ACIDS CAP 1000 MG SCH GM: 1000 CAP at 08:02

## 2019-10-05 RX ADMIN — Medication PRN ML: at 18:58

## 2019-10-05 RX ADMIN — Medication PRN ML: at 17:48

## 2019-10-05 NOTE — PCM.PN
- General Info


Date of Service: 10/05/19


Subjective Update: 


DANIAL is doing good this morning. Pain in the left knee and right elbow is is 

better.


Functional Status: Reports: Pain Controlled





- Review of Systems


HEENT: Reports: No Symptoms


Pulmonary: Reports: No Symptoms


Cardiovascular: Reports: No Symptoms


Gastrointestinal: Reports: No Symptoms





- Patient Data


Vitals - Most Recent: 


 Last Vital Signs











Temp  98.2 F   10/05/19 03:38


 


Pulse  99   10/05/19 08:03


 


Resp  20   10/05/19 03:38


 


BP  111/68   10/05/19 08:03


 


Pulse Ox  96   10/05/19 03:38











Weight - Most Recent: 85.457 kg


I&O - Last 24 Hours: 


 Intake & Output











 10/04/19 10/05/19 10/05/19





 22:59 06:59 14:59


 


Intake Total 1140 200 


 


Output Total 650 700 


 


Balance 490 -500 











Lab Results Last 24 Hours: 


 Laboratory Results - last 24 hr











  10/04/19 10/05/19 10/05/19 Range/Units





  10:40 06:10 06:10 


 


WBC   14.3 H   (4.5-12.0)  X10-3/uL


 


RBC   3.20 L   (3.23-5.20)  x10(6)uL


 


Hgb   9.9 L   (11.5-15.5)  g/dL


 


Hct   29.9 L   (30.0-51.3)  %


 


MCV   93.2   (80-96)  fL


 


MCH   30.8   (27.7-33.6)  pg


 


MCHC   33.1   (32.2-35.4)  g/dL


 


RDW   15.7 H   (11.5-15.5)  %


 


Plt Count   270   (125-369)  X10(3)uL


 


MPV   8.6   (7.4-10.4)  fL


 


Add Manual Diff   Yes   


 


Neutrophils % (Manual)   94 H   (46-82)  %


 


Band Neutrophils %   2   (0-6)  %


 


Lymphocytes % (Manual)   2 L   (13-37)  %


 


Monocytes % (Manual)   2 L   (4-12)  %


 


PT  35.1 H*    (8.7-11.1)  


 


INR  3.67 H    (0.89-1.13)  


 


Sodium    135  (135-145)  mmol/L


 


Potassium    3.9  (3.5-5.3)  mmol/L


 


Chloride    101  (100-110)  mmol/L


 


Carbon Dioxide    26  (21-32)  mmol/L


 


BUN    63 H D  (7-18)  mg/dL


 


Creatinine    1.5 H  (0.55-1.02)  mg/dL


 


Est Cr Clr Drug Dosing    23.92  mL/min


 


Estimated GFR (MDRD)    33 L  (>60)  


 


BUN/Creatinine Ratio    42.0 H  (9-20)  


 


Glucose    107  ()  mg/dL


 


Calcium    9.2  (8.6-10.2)  mg/dL


 


NT-Pro-B Natriuret Pep     (<=450)  pg/mL














  10/05/19 10/05/19 Range/Units





  06:10 06:10 


 


WBC    (4.5-12.0)  X10-3/uL


 


RBC    (3.23-5.20)  x10(6)uL


 


Hgb    (11.5-15.5)  g/dL


 


Hct    (30.0-51.3)  %


 


MCV    (80-96)  fL


 


MCH    (27.7-33.6)  pg


 


MCHC    (32.2-35.4)  g/dL


 


RDW    (11.5-15.5)  %


 


Plt Count    (125-369)  X10(3)uL


 


MPV    (7.4-10.4)  fL


 


Add Manual Diff    


 


Neutrophils % (Manual)    (46-82)  %


 


Band Neutrophils %    (0-6)  %


 


Lymphocytes % (Manual)    (13-37)  %


 


Monocytes % (Manual)    (4-12)  %


 


PT   31.1 H  (8.7-11.1)  


 


INR   3.25 H  (0.89-1.13)  


 


Sodium    (135-145)  mmol/L


 


Potassium    (3.5-5.3)  mmol/L


 


Chloride    (100-110)  mmol/L


 


Carbon Dioxide    (21-32)  mmol/L


 


BUN    (7-18)  mg/dL


 


Creatinine    (0.55-1.02)  mg/dL


 


Est Cr Clr Drug Dosing    mL/min


 


Estimated GFR (MDRD)    (>60)  


 


BUN/Creatinine Ratio    (9-20)  


 


Glucose    ()  mg/dL


 


Calcium    (8.6-10.2)  mg/dL


 


NT-Pro-B Natriuret Pep  7721 H*   (<=450)  pg/mL











Nathan Results Last 24 Hours: 


 Microbiology











 10/03/19 18:55 Aerobic Blood Culture - Preliminary





 Blood - Venous - Lab Draw    NO GROWTH AFTER 1 DAY





 Anaerobic Blood Culture - Preliminary





    NO GROWTH AFTER 1 DAY


 


 10/03/19 18:50 Aerobic Blood Culture - Preliminary





 Blood - Venous    NO GROWTH AFTER 1 DAY





 Anaerobic Blood Culture - Preliminary





    NO GROWTH AFTER 1 DAY











Med Orders - Current: 


 Current Medications





Acetaminophen (Tylenol)  650 mg PO Q4H PRN


   PRN Reason: Pain (Mild 1-3)/fever


   Last Admin: 10/03/19 20:40 Dose:  650 mg


Acetaminophen (Tylenol Extra Strength)  1,000 mg PO WITHDINNER [facility]


   Last Admin: 10/04/19 17:36 Dose:  1,000 mg


Artificial Tears (Refresh Liquigel 1%)  0 ml EYEBOTH BEDTIME [facility]


   Last Admin: 10/04/19 21:28 Dose:  1 drop


Aspirin (Aspirin)  81 mg PO DAILY [facility]


   Last Admin: 10/05/19 08:02 Dose:  81 mg


Atorvastatin Calcium (Lipitor)  60 mg PO BEDTIME [facility]


   Last Admin: 10/04/19 21:26 Dose:  60 mg


Ceftriaxone Sodium (Rocephin)  1 gm IVPUSH Q24H [facility]


   Last Admin: 10/04/19 17:38 Dose:  1 gm


Ezetimibe (Zetia)  10 mg PO DAILY [facility]


   Last Admin: 10/05/19 08:01 Dose:  10 mg


Fish Oil (Fish Oil)  1 gm PO DAILY [facility]


   Last Admin: 10/05/19 08:02 Dose:  1 gm


Furosemide (Lasix)  20 mg PO DAILY [facility]


   Last Admin: 10/05/19 08:02 Dose:  20 mg


Azithromycin 500 mg/ Sodium (Chloride)  250 mls @ 250 mls/hr IV Q24H [facility]


   Last Admin: 10/04/19 17:52 Dose:  250 mls/hr


Sodium Chloride (Normal Saline)  1,000 mls @ 2,000 mls/hr IV ASDIRECTED [facility]


   Last Infusion: 10/04/19 00:25 Dose:  125 mls/hr


Influenza Virus Vaccine (Fluzone Quad 0112-8718 Syringe)  60 mcg IM .ONCE ONE


   Stop: 10/05/19 09:01


Loperamide HCl (Imodium)  2 mg PO ASDIRECTED PRN


   PRN Reason: LOOSE STOOLS


Metoprolol Succinate (Toprol Xl)  50 mg PO DAILY [facility]


   Last Admin: 10/05/19 08:03 Dose:  50 mg


Multivitamins/Minerals/Vitamin C (Tab-A-José Antonio)  1 tab PO DAILY [facility]


   Last Admin: 10/05/19 08:02 Dose:  1 tab


Paroxetine HCl (Paxil)  40 mg PO DAILY [facility]


   Last Admin: 10/05/19 08:01 Dose:  40 mg


Sodium Chloride (Saline Flush)  10 ml FLUSH ASDIRECTED PRN


   PRN Reason: Keep Vein Open


Tramadol HCl (Ultram)  50 mg PO Q8H PRN


   PRN Reason: Pain


   Last Admin: 10/05/19 03:45 Dose:  50 mg


Warfarin Sodium (Coumadin Sliding Scale)  1 each PO ASDIRECTED [facility]





Discontinued Medications





Sodium Chloride (Normal Saline)  1,000 mls @ 500 mls/hr IV ASDIRECTED [facility]


   Last Admin: 10/03/19 16:00 Dose:  500 mls/hr


Ceftriaxone Sodium 1 gm/ (Sodium Chloride)  50 mls @ 200 mls/hr IV Q24H [facility]


   Last Admin: 10/03/19 18:17 Dose:  200 mls/hr


Sodium Chloride (Normal Saline)  1,000 mls @ 125 mls/hr IV ASDIRECTED [facility]


   Last Admin: 10/04/19 07:05 Dose:  125 mls/hr


Influenza Virus Vaccine (Pharmacy To Dose - Influenza Vaccine)  1 each IM 

ONETIME ONE


   Stop: 10/04/19 18:19











- Exam


Quality Assessment: Supplemental Oxygen


General: Alert


HEENT: Pupils Equal


Neck: Supple


Lungs: Crackles, Rales


Cardiovascular: Regular Rate


Skin: Warm


Neurological: No New Focal Deficit


Psy/Mental Status: Alert





- Problem List & Annotations


(1) Pneumonia


SNOMED Code(s): 249551818


   Code(s): J18.9 - PNEUMONIA, UNSPECIFIED ORGANISM   Status: Acute   Current 

Visit: Yes   Annotation/Comment:: Admit, start antibx, provide supplemental 02 

  





(2) Knee pain


SNOMED Code(s): 77193016


   Code(s): M25.569 - PAIN IN UNSPECIFIED KNEE   Status: Acute   Current Visit: 

Yes   


Qualifiers: 


   Chronicity: acute   Laterality: left   Qualified Code(s): M25.562 - Pain in 

left knee   





(3) CAD (coronary artery disease)


SNOMED Code(s): 65559321


   Code(s): I25.10 - ATHSCL HEART DISEASE OF NATIVE CORONARY ARTERY W/O ANG 

PCTRS   Status: Chronic   Current Visit: Yes   


Qualifiers: 


   Coronary Disease-Associated Artery/Lesion type: bypass graft   Native vs. 

transplanted heart: native heart   Associated angina: without angina   

Qualified Code(s): I25.810 - Atherosclerosis of coronary artery bypass graft(s) 

without angina pectoris   





(4) CHF (congestive heart failure)


SNOMED Code(s): 97634248


   Code(s): I50.9 - HEART FAILURE, UNSPECIFIED   Status: Acute   Current Visit: 

Yes   


Qualifiers: 


   Heart failure type: systolic 





(5) HTN (hypertension)


SNOMED Code(s): 58269963


   Code(s): I10 - ESSENTIAL (PRIMARY) HYPERTENSION   Status: Chronic   Current 

Visit: Yes   


Qualifiers: 


   Hypertension type: essential hypertension   Qualified Code(s): I10 - 

Essential (primary) hypertension   





(6) Prerenal azotemia


SNOMED Code(s): 786551328


   Code(s): R79.89 - OTHER SPECIFIED ABNORMAL FINDINGS OF BLOOD CHEMISTRY   

Status: Acute   Current Visit: Yes   Annotation/Comment:: Rehydration with NS. 

   





(7) Afib


SNOMED Code(s): 64277478


   Code(s): I48.91 - UNSPECIFIED ATRIAL FIBRILLATION   Status: Acute   Current 

Visit: Yes   


Qualifiers: 


   Atrial fibrillation type: paroxysmal   Qualified Code(s): I48.0 - Paroxysmal 

atrial fibrillation   





(8) INGRID (obstructive sleep apnea)


SNOMED Code(s): 51880147


   Code(s): G47.33 - OBSTRUCTIVE SLEEP APNEA (ADULT) (PEDIATRIC)   Status: 

Acute   Current Visit: Yes   





(9) Valvular heart disease


Status: Acute   Current Visit: Yes   





(10) Osteoarthritis


SNOMED Code(s): 247930468


   Code(s): M19.90 - UNSPECIFIED OSTEOARTHRITIS, UNSPECIFIED SITE   Status: 

Acute   Current Visit: Yes   





(11) IRAJ (acute kidney injury)


SNOMED Code(s): 65067480, 39961842


   Code(s): N17.9 - ACUTE KIDNEY FAILURE, UNSPECIFIED   Status: Acute   Current 

Visit: Yes   





- Problem List Review


Problem List Initiated/Reviewed/Updated: Yes





- My Orders


Last 24 Hours: 


My Active Orders





10/04/19 08:53


OT Evaluation and Treatment [CONS] Routine 


PT Evaluation and Treatment [CONS] Routine 





10/04/19 08:56


traMADol [Ultram]   50 mg PO Q8H PRN 





10/04/19 09:00


Aspirin   81 mg PO DAILY 


Ezetimibe [Zetia]   10 mg PO DAILY 


Metoprolol Succinate [Toprol XL]   50 mg PO DAILY 


Multivitamins [Tab-A-José Antonio]   1 tab PO DAILY 


PARoxetine [Paxil]   40 mg PO DAILY 





10/04/19 09:15


Fish Oil/Omega-3 Fatty Acids [Fish Oil]   1 gm PO DAILY 


Loperamide [Imodium]   2 mg PO ASDIRECTED PRN 





10/04/19 09:30


Warfarin Sliding Scale [Coumadin Sliding Scale]   1 each PO ASDIRECTED 





10/04/19 18:00


Acetaminophen [Tylenol Extra Strength]   1,000 mg PO WITHDINNER 





10/04/19 18:19


Influenza Vaccine Charge [RC] .DISCHARGE 





10/04/19 21:00


Carboxymethylcellulose Sodium [Refresh Liquigel 1%]   0 ml EYEBOTH BEDTIME 


atorvaSTATin [Lipitor]   60 mg PO BEDTIME 





10/05/19 08:42


Weight Daily [Height and Weight] [] .Q06 





10/05/19 09:00


FLU Vacc [id number](6MOS+)/PF [Fluzone Quad 4567-6570 Syringe]   60 mcg IM .ONCE 

ONE 


Furosemide [Lasix]   20 mg PO DAILY 





10/06/19 05:11


BASIC METABOLIC PANEL,BMP [CHEM] AM 


CBC WITH AUTO DIFF [HEME] AM 


PRO B-TYPE NATRIUR PEPT,BNPPRO [CHEM] DAILY 





10/06/19 09:18


INR,PT,PROTHROMBIN TIME [COAG] DAILY 





10/07/19 05:11


PRO B-TYPE NATRIUR PEPT,BNPPRO [CHEM] DAILY 





10/08/19 05:11


PRO B-TYPE NATRIUR PEPT,BNPPRO [CHEM] DAILY 














- Plan


Plan:: 


I looked at the x-ray which showed pneumonia, and the kidney injuries 

better.Continue with current meds,and encourage IS. Use Tramadol prn

## 2019-10-06 RX ADMIN — Medication PRN ML: at 17:49

## 2019-10-06 RX ADMIN — Medication PRN ML: at 08:39

## 2019-10-06 RX ADMIN — OMEGA-3 FATTY ACIDS CAP 1000 MG SCH GM: 1000 CAP at 08:44

## 2019-10-06 RX ADMIN — CARBOXYMETHYLCELLULOSE SODIUM SCH DROP: 10 GEL OPHTHALMIC at 20:53

## 2019-10-06 NOTE — PCM.PN
- General Info


Date of Service: 10/06/19


Subjective Update: 


Zara is doing better today, still has some cough and shortness of breath,no 

fever. Blood cultures -one bottle is growing up unidentified organism


Functional Status: Reports: Pain Controlled





- Review of Systems


General: Reports: No Symptoms


HEENT: Reports: No Symptoms


Pulmonary: Reports: Shortness of Breath, Sputum


Cardiovascular: Reports: No Symptoms


Gastrointestinal: Reports: No Symptoms


Genitourinary: Reports: No Symptoms





- Patient Data


Vitals - Most Recent: 


 Last Vital Signs











Temp  97.7 F   10/06/19 04:00


 


Pulse  98   10/06/19 04:00


 


Resp  20   10/06/19 04:00


 


BP  113/72   10/06/19 04:00


 


Pulse Ox  96   10/06/19 04:00











Weight - Most Recent: 88.451 kg


I&O - Last 24 Hours: 


 Intake & Output











 10/05/19 10/06/19 10/06/19





 22:59 06:59 14:59


 


Intake Total 250 0 


 


Balance 250 0 











Lab Results Last 24 Hours: 


 Laboratory Results - last 24 hr











  10/06/19 10/06/19 10/06/19 Range/Units





  06:20 06:20 06:20 


 


WBC   14.7 H   (4.5-12.0)  X10-3/uL


 


RBC   3.21 L   (3.23-5.20)  x10(6)uL


 


Hgb   9.9 L   (11.5-15.5)  g/dL


 


Hct   29.6 L   (30.0-51.3)  %


 


MCV   92.5   (80-96)  fL


 


MCH   31.0   (27.7-33.6)  pg


 


MCHC   33.5   (32.2-35.4)  g/dL


 


RDW   15.6 H   (11.5-15.5)  %


 


Plt Count   326   (125-369)  X10(3)uL


 


MPV   8.2   (7.4-10.4)  fL


 


Add Manual Diff   Yes   


 


Neutrophils % (Manual)   82   (46-82)  %


 


Band Neutrophils %   2   (0-6)  %


 


Lymphocytes % (Manual)   11 L   (13-37)  %


 


Monocytes % (Manual)   4   (4-12)  %


 


Eosinophils % (Manual)   1   (0-5)  %


 


PT  26.3 H    (8.7-11.1)  


 


INR  2.74 H    (0.89-1.13)  


 


Sodium    137  (135-145)  mmol/L


 


Potassium    3.8  (3.5-5.3)  mmol/L


 


Chloride    102  (100-110)  mmol/L


 


Carbon Dioxide    29  (21-32)  mmol/L


 


BUN    49 H D  (7-18)  mg/dL


 


Creatinine    1.2 H  (0.55-1.02)  mg/dL


 


Est Cr Clr Drug Dosing    29.90  mL/min


 


Estimated GFR (MDRD)    43 L  (>60)  


 


BUN/Creatinine Ratio    40.8 H  (9-20)  


 


Glucose    109  ()  mg/dL


 


Calcium    8.9  (8.6-10.2)  mg/dL


 


NT-Pro-B Natriuret Pep     (<=450)  pg/mL














  10/06/19 Range/Units





  06:20 


 


WBC   (4.5-12.0)  X10-3/uL


 


RBC   (3.23-5.20)  x10(6)uL


 


Hgb   (11.5-15.5)  g/dL


 


Hct   (30.0-51.3)  %


 


MCV   (80-96)  fL


 


MCH   (27.7-33.6)  pg


 


MCHC   (32.2-35.4)  g/dL


 


RDW   (11.5-15.5)  %


 


Plt Count   (125-369)  X10(3)uL


 


MPV   (7.4-10.4)  fL


 


Add Manual Diff   


 


Neutrophils % (Manual)   (46-82)  %


 


Band Neutrophils %   (0-6)  %


 


Lymphocytes % (Manual)   (13-37)  %


 


Monocytes % (Manual)   (4-12)  %


 


Eosinophils % (Manual)   (0-5)  %


 


PT   (8.7-11.1)  


 


INR   (0.89-1.13)  


 


Sodium   (135-145)  mmol/L


 


Potassium   (3.5-5.3)  mmol/L


 


Chloride   (100-110)  mmol/L


 


Carbon Dioxide   (21-32)  mmol/L


 


BUN   (7-18)  mg/dL


 


Creatinine   (0.55-1.02)  mg/dL


 


Est Cr Clr Drug Dosing   mL/min


 


Estimated GFR (MDRD)   (>60)  


 


BUN/Creatinine Ratio   (9-20)  


 


Glucose   ()  mg/dL


 


Calcium   (8.6-10.2)  mg/dL


 


NT-Pro-B Natriuret Pep  9975 H*  (<=450)  pg/mL











Nathan Results Last 24 Hours: 


 Microbiology











 10/03/19 18:50 Aerobic Blood Culture - Preliminary





 Blood - Venous    NO GROWTH AFTER 2 DAYS





 Anaerobic Blood Culture - Preliminary





    NO GROWTH AFTER 2 DAYS


 


 10/03/19 18:55 Aerobic Blood Culture - Preliminary





 Blood - Venous - Lab Draw    NO GROWTH AFTER 2 DAYS





 Anaerobic Blood Culture - Preliminary





    Unidentified Organism











Med Orders - Current: 


 Current Medications





Acetaminophen (Tylenol)  650 mg PO Q4H PRN


   PRN Reason: Pain (Mild 1-3)/fever


   Last Admin: 10/05/19 21:57 Dose:  650 mg


Acetaminophen (Tylenol Extra Strength)  1,000 mg PO WITHDINNER [facility]


   Last Admin: 10/05/19 19:17 Dose:  1,000 mg


Artificial Tears (Refresh Liquigel 1%)  0 ml EYEBOTH BEDTIME [facility]


   Last Admin: 10/05/19 21:59 Dose:  1 drop


Aspirin (Aspirin)  81 mg PO DAILY [facility]


   Last Admin: 10/05/19 08:02 Dose:  81 mg


Atorvastatin Calcium (Lipitor)  60 mg PO BEDTIME [facility]


   Last Admin: 10/05/19 21:58 Dose:  60 mg


Ceftriaxone Sodium (Rocephin)  1 gm IVPUSH Q24H [facility]


   Last Admin: 10/05/19 17:02 Dose:  1 gm


Ezetimibe (Zetia)  10 mg PO DAILY [facility]


   Last Admin: 10/05/19 08:01 Dose:  10 mg


Fish Oil (Fish Oil)  1 gm PO DAILY [facility]


   Last Admin: 10/05/19 08:02 Dose:  1 gm


Furosemide (Lasix)  20 mg PO DAILY [facility]


   Last Admin: 10/05/19 08:02 Dose:  20 mg


Loperamide HCl (Imodium)  2 mg PO ASDIRECTED PRN


   PRN Reason: LOOSE STOOLS


Metoprolol Succinate (Toprol Xl)  50 mg PO DAILY [facility]


   Last Admin: 10/05/19 08:03 Dose:  50 mg


Multivitamins/Minerals/Vitamin C (Tab-A-José Antonio)  1 tab PO DAILY [facility]


   Last Admin: 10/05/19 08:02 Dose:  1 tab


Paroxetine HCl (Paxil)  40 mg PO DAILY [facility]


   Last Admin: 10/05/19 08:01 Dose:  40 mg


Sodium Chloride (Saline Flush)  10 ml FLUSH ASDIRECTED PRN


   PRN Reason: Keep Vein Open


   Last Admin: 10/05/19 18:58 Dose:  10 ml


Tramadol HCl (Ultram)  50 mg PO Q8H PRN


   PRN Reason: Pain


   Last Admin: 10/05/19 16:57 Dose:  50 mg


Warfarin Sodium (Coumadin Sliding Scale)  1 each PO ASDIRECTED [facility]


Warfarin Sodium (Coumadin)  2.5 mg PO 1600 [facility]


   Stop: 10/06/19 16:01





Discontinued Medications





Furosemide (Lasix)  40 mg IVPUSH NOW ONE


   Stop: 10/06/19 07:33


Sodium Chloride (Normal Saline)  1,000 mls @ 500 mls/hr IV ASDIRECTED [facility]


   Last Admin: 10/03/19 16:00 Dose:  500 mls/hr


Azithromycin 500 mg/ Sodium (Chloride)  250 mls @ 250 mls/hr IV Q24H [facility]


   Last Admin: 10/05/19 17:47 Dose:  250 mls/hr


Ceftriaxone Sodium 1 gm/ (Sodium Chloride)  50 mls @ 200 mls/hr IV Q24H [facility]


   Last Admin: 10/03/19 18:17 Dose:  200 mls/hr


Sodium Chloride (Normal Saline)  1,000 mls @ 2,000 mls/hr IV ASDIRECTED [facility]


   Last Infusion: 10/04/19 00:25 Dose:  125 mls/hr


Sodium Chloride (Normal Saline)  1,000 mls @ 125 mls/hr IV ASDIRECTED [facility]


   Last Admin: 10/04/19 07:05 Dose:  125 mls/hr


Influenza Virus Vaccine (Pharmacy To Dose - Influenza Vaccine)  1 each IM 

ONETIME ONE


   Stop: 10/04/19 18:19


Influenza Virus Vaccine (Fluzone Quad 7007-5016 Syringe)  60 mcg IM .ONCE ONE


   Stop: 10/05/19 09:01


   Last Admin: 10/05/19 09:36 Dose:  Not Given











- Exam


General: Alert


HEENT: Pupils Equal


Neck: Supple


Lungs: Crackles, Rales


Cardiovascular: Regular Rate


GI/Abdominal Exam: Soft





- Problem List & Annotations


(1) Pneumonia


SNOMED Code(s): 460431427


   Code(s): J18.9 - PNEUMONIA, UNSPECIFIED ORGANISM   Status: Acute   Current 

Visit: Yes   Annotation/Comment:: Admit, start antibx, provide supplemental 02 

  





(2) Knee pain


SNOMED Code(s): 13366257


   Code(s): M25.569 - PAIN IN UNSPECIFIED KNEE   Status: Acute   Current Visit: 

Yes   


Qualifiers: 


   Chronicity: acute   Laterality: left   Qualified Code(s): M25.562 - Pain in 

left knee   





(3) CAD (coronary artery disease)


SNOMED Code(s): 71394840


   Code(s): I25.10 - ATHSCL HEART DISEASE OF NATIVE CORONARY ARTERY W/O ANG 

PCTRS   Status: Chronic   Current Visit: Yes   


Qualifiers: 


   Coronary Disease-Associated Artery/Lesion type: bypass graft   Native vs. 

transplanted heart: native heart   Associated angina: without angina   

Qualified Code(s): I25.810 - Atherosclerosis of coronary artery bypass graft(s) 

without angina pectoris   





(4) CHF (congestive heart failure)


SNOMED Code(s): 45793818


   Code(s): I50.9 - HEART FAILURE, UNSPECIFIED   Status: Acute   Current Visit: 

Yes   


Qualifiers: 


   Heart failure type: systolic 





(5) HTN (hypertension)


SNOMED Code(s): 49998877


   Code(s): I10 - ESSENTIAL (PRIMARY) HYPERTENSION   Status: Chronic   Current 

Visit: Yes   


Qualifiers: 


   Hypertension type: essential hypertension   Qualified Code(s): I10 - 

Essential (primary) hypertension   





(6) Prerenal azotemia


SNOMED Code(s): 655880482


   Code(s): R79.89 - OTHER SPECIFIED ABNORMAL FINDINGS OF BLOOD CHEMISTRY   

Status: Acute   Current Visit: Yes   Annotation/Comment:: Rehydration with NS. 

   





(7) Afib


SNOMED Code(s): 13771431


   Code(s): I48.91 - UNSPECIFIED ATRIAL FIBRILLATION   Status: Acute   Current 

Visit: Yes   


Qualifiers: 


   Atrial fibrillation type: paroxysmal   Qualified Code(s): I48.0 - Paroxysmal 

atrial fibrillation   





(8) INGRID (obstructive sleep apnea)


SNOMED Code(s): 74474359


   Code(s): G47.33 - OBSTRUCTIVE SLEEP APNEA (ADULT) (PEDIATRIC)   Status: 

Acute   Current Visit: Yes   





(9) Valvular heart disease


Status: Acute   Current Visit: Yes   





(10) Osteoarthritis


SNOMED Code(s): 161113421


   Code(s): M19.90 - UNSPECIFIED OSTEOARTHRITIS, UNSPECIFIED SITE   Status: 

Acute   Current Visit: Yes   





(11) IRAJ (acute kidney injury)


SNOMED Code(s): 75771332, 51132038


   Code(s): N17.9 - ACUTE KIDNEY FAILURE, UNSPECIFIED   Status: Acute   Current 

Visit: Yes   





(12) Bacteremia


SNOMED Code(s): 8803368


   Code(s): R78.81 - BACTEREMIA   Status: Acute   Current Visit: Yes   





- Problem List Review


Problem List Initiated/Reviewed/Updated: Yes





- My Orders


Last 24 Hours: 


My Active Orders





10/05/19 08:42


Weight Daily [Height and Weight] [RC] .Q06 





10/05/19 09:00


Furosemide [Lasix]   20 mg PO DAILY 





10/06/19 07:32


Chest 2V [CR] Routine 





10/06/19 07:37


IS (RT) [RT Incentive Spirometry] [RC] Q4HWA 





10/07/19 05:11


CBC WITH AUTO DIFF [HEME] AM 


COMPREHENSIVE METABOLIC PN,CMP [CHEM] AM 


PRO B-TYPE NATRIUR PEPT,BNPPRO [CHEM] DAILY 





10/08/19 05:11


PRO B-TYPE NATRIUR PEPT,BNPPRO [CHEM] DAILY 














- Plan


Plan:: 


Discontinue azithromycin, but continue the Rocephin IV. Encourage IS. Repeat 

chest x-ray, labs. I will see elected to give her 1 dose of Lasix for diuresis

## 2019-10-07 RX ADMIN — Medication PRN ML: at 07:35

## 2019-10-07 RX ADMIN — OMEGA-3 FATTY ACIDS CAP 1000 MG SCH GM: 1000 CAP at 08:19

## 2019-10-07 RX ADMIN — Medication PRN ML: at 17:19

## 2019-10-07 RX ADMIN — Medication PRN ML: at 09:42

## 2019-10-07 RX ADMIN — CARBOXYMETHYLCELLULOSE SODIUM SCH DROP: 10 GEL OPHTHALMIC at 21:24

## 2019-10-07 RX ADMIN — Medication PRN ML: at 14:23

## 2019-10-07 NOTE — PCM.PN
- General Info


Date of Service: 10/07/19


Subjective Update: 


Zara feels much better today.


Functional Status: Reports: Pain Controlled





- Review of Systems


Pulmonary: Reports: No Symptoms


Cardiovascular: Reports: No Symptoms


Gastrointestinal: Reports: No Symptoms





- Patient Data


Vitals - Most Recent: 


 Last Vital Signs











Temp  97.4 F   10/07/19 00:00


 


Pulse  115 H  10/07/19 08:19


 


Resp  20   10/07/19 00:00


 


BP  122/78   10/07/19 08:19


 


Pulse Ox  92 L  10/07/19 04:01











Weight - Most Recent: 87.26 kg


I&O - Last 24 Hours: 


 Intake & Output











 10/06/19 10/07/19 10/07/19





 22:59 06:59 14:59


 


Intake Total 0  


 


Balance 0  











Lab Results Last 24 Hours: 


 Laboratory Results - last 24 hr











  10/07/19 10/07/19 10/07/19 Range/Units





  06:40 06:40 06:40 


 


WBC   14.7 H   (4.5-12.0)  X10-3/uL


 


RBC   3.23   (3.23-5.20)  x10(6)uL


 


Hgb   10.0 L   (11.5-15.5)  g/dL


 


Hct   30.0   (30.0-51.3)  %


 


MCV   93.1   (80-96)  fL


 


MCH   31.1   (27.7-33.6)  pg


 


MCHC   33.4   (32.2-35.4)  g/dL


 


RDW   15.2   (11.5-15.5)  %


 


Plt Count   406 H   (125-369)  X10(3)uL


 


MPV   8.2   (7.4-10.4)  fL


 


Add Manual Diff   Yes   


 


Neutrophils % (Manual)   86 H   (46-82)  %


 


Lymphocytes % (Manual)   7 L   (13-37)  %


 


Monocytes % (Manual)   7   (4-12)  %


 


Sodium    137  (135-145)  mmol/L


 


Potassium    3.9  (3.5-5.3)  mmol/L


 


Chloride    102  (100-110)  mmol/L


 


Carbon Dioxide    28  (21-32)  mmol/L


 


BUN    46 H  (7-18)  mg/dL


 


Creatinine    1.4 H  (0.55-1.02)  mg/dL


 


Est Cr Clr Drug Dosing    25.63  mL/min


 


Estimated GFR (MDRD)    36 L  (>60)  


 


BUN/Creatinine Ratio    32.9 H  (9-20)  


 


Glucose    101  ()  mg/dL


 


Calcium    8.7  (8.6-10.2)  mg/dL


 


Total Bilirubin    0.5  (0.1-1.3)  mg/dL


 


AST    119 H  (5-25)  IU/L


 


ALT    98 H D  (12-36)  U/L


 


Alkaline Phosphatase    111  ()  IU/L


 


NT-Pro-B Natriuret Pep  37844 H*    (<=450)  pg/mL


 


Total Protein    6.1  (6.0-8.0)  g/dL


 


Albumin    1.7 L*  (3.2-4.6)  g/dL


 


Globulin    4.4  g/dL


 


Albumin/Globulin Ratio    0.4  











Nathan Results Last 24 Hours: 


 Microbiology











 10/03/19 18:55 Aerobic Blood Culture - Preliminary





 Blood - Venous - Lab Draw    NO GROWTH AFTER 3 DAYS





 Anaerobic Blood Culture - Preliminary





    Gram Positive Cocci


 


 10/03/19 18:50 Aerobic Blood Culture - Preliminary





 Blood - Venous    NO GROWTH AFTER 3 DAYS





 Anaerobic Blood Culture - Preliminary





    NO GROWTH AFTER 3 DAYS











Med Orders - Current: 


 Current Medications





Acetaminophen (Tylenol)  650 mg PO Q4H PRN


   PRN Reason: Pain (Mild 1-3)/fever


   Last Admin: 10/05/19 21:57 Dose:  650 mg


Acetaminophen (Tylenol Extra Strength)  1,000 mg PO WITHDINNER [facility]


   Last Admin: 10/06/19 18:59 Dose:  1,000 mg


Artificial Tears (Refresh Liquigel 1%)  0 ml EYEBOTH BEDTIME [facility]


   Last Admin: 10/06/19 20:53 Dose:  1 drop


Aspirin (Aspirin)  81 mg PO DAILY [facility]


   Last Admin: 10/07/19 08:19 Dose:  81 mg


Atorvastatin Calcium (Lipitor)  60 mg PO BEDTIME [facility]


   Last Admin: 10/06/19 20:53 Dose:  60 mg


Ceftriaxone Sodium (Rocephin)  1 gm IVPUSH Q24H [facility]


   Last Admin: 10/06/19 17:50 Dose:  1 gm


Ezetimibe (Zetia)  10 mg PO DAILY [facility]


   Last Admin: 10/07/19 08:19 Dose:  10 mg


Fish Oil (Fish Oil)  1 gm PO DAILY [facility]


   Last Admin: 10/07/19 08:19 Dose:  1 gm


Furosemide (Lasix)  20 mg PO DAILY [facility]


   Last Admin: 10/07/19 08:19 Dose:  20 mg


Furosemide (Lasix)  20 mg IVPUSH BID [facility]


Influenza Virus Vaccine (Fluzone Quad 7425-5017 Syringe)  60 mcg IM .ONCE ONE


   Stop: 10/07/19 09:01


Loperamide HCl (Imodium)  2 mg PO ASDIRECTED PRN


   PRN Reason: LOOSE STOOLS


Metolazone (Zaroxolyn)  2.5 mg PO ONETIME ONE


   Stop: 10/07/19 08:42


Metoprolol Succinate (Toprol Xl)  50 mg PO DAILY [facility]


   Last Admin: 10/07/19 08:19 Dose:  50 mg


Multivitamins/Minerals/Vitamin C (Tab-A-José Antonio)  1 tab PO DAILY [facility]


   Last Admin: 10/07/19 08:19 Dose:  1 tab


Paroxetine HCl (Paxil)  40 mg PO DAILY [facility]


   Last Admin: 10/07/19 08:19 Dose:  40 mg


Sodium Chloride (Saline Flush)  10 ml FLUSH ASDIRECTED PRN


   PRN Reason: Keep Vein Open


   Last Admin: 10/07/19 07:35 Dose:  10 ml


Tramadol HCl (Ultram)  50 mg PO Q8H PRN


   PRN Reason: Pain


   Last Admin: 10/07/19 04:00 Dose:  50 mg


Warfarin Sodium (Coumadin Sliding Scale)  1 each PO ASDIRECTED [facility]


Warfarin Sodium (Coumadin)  2.5 mg PO ONETIME ONE


   Stop: 10/07/19 16:01





Discontinued Medications





Furosemide (Lasix)  40 mg IVPUSH NOW ONE


   Stop: 10/06/19 07:33


   Last Admin: 10/06/19 08:39 Dose:  40 mg


Sodium Chloride (Normal Saline)  1,000 mls @ 500 mls/hr IV ASDIRECTED [facility]


   Last Admin: 10/03/19 16:00 Dose:  500 mls/hr


Azithromycin 500 mg/ Sodium (Chloride)  250 mls @ 250 mls/hr IV Q24H [facility]


   Last Admin: 10/05/19 17:47 Dose:  250 mls/hr


Ceftriaxone Sodium 1 gm/ (Sodium Chloride)  50 mls @ 200 mls/hr IV Q24H [facility]


   Last Admin: 10/03/19 18:17 Dose:  200 mls/hr


Sodium Chloride (Normal Saline)  1,000 mls @ 2,000 mls/hr IV ASDIRECTED [facility]


   Last Infusion: 10/04/19 00:25 Dose:  125 mls/hr


Sodium Chloride (Normal Saline)  1,000 mls @ 125 mls/hr IV ASDIRECTED [facility]


   Last Admin: 10/04/19 07:05 Dose:  125 mls/hr


Influenza Virus Vaccine (Pharmacy To Dose - Influenza Vaccine)  1 each IM 

ONETIME ONE


   Stop: 10/04/19 18:19


Influenza Virus Vaccine (Fluzone Quad 3892-8110 Syringe)  60 mcg IM .ONCE ONE


   Stop: 10/05/19 09:01


   Last Admin: 10/05/19 09:36 Dose:  Not Given


Warfarin Sodium (Coumadin)  2.5 mg PO 1600 [facility]


   Stop: 10/06/19 16:01


   Last Admin: 10/06/19 16:44 Dose:  2.5 mg











- Exam


General: Alert


HEENT: Pupils Equal


Lungs: Crackles


Cardiovascular: Regular Rate, Irregular Rhythm





- Problem List & Annotations


(1) Pneumonia


SNOMED Code(s): 902370214


   Code(s): J18.9 - PNEUMONIA, UNSPECIFIED ORGANISM   Status: Acute   Current 

Visit: Yes   Annotation/Comment:: Admit, start antibx, provide supplemental 02 

  





(2) Knee pain


SNOMED Code(s): 44956149


   Code(s): M25.569 - PAIN IN UNSPECIFIED KNEE   Status: Acute   Current Visit: 

Yes   


Qualifiers: 


   Chronicity: acute   Laterality: left   Qualified Code(s): M25.562 - Pain in 

left knee   





(3) CAD (coronary artery disease)


SNOMED Code(s): 63827063


   Code(s): I25.10 - ATHSCL HEART DISEASE OF NATIVE CORONARY ARTERY W/O ANG 

PCTRS   Status: Chronic   Current Visit: Yes   


Qualifiers: 


   Coronary Disease-Associated Artery/Lesion type: bypass graft   Native vs. 

transplanted heart: native heart   Associated angina: without angina   

Qualified Code(s): I25.810 - Atherosclerosis of coronary artery bypass graft(s) 

without angina pectoris   





(4) CHF (congestive heart failure)


SNOMED Code(s): 66679027


   Code(s): I50.9 - HEART FAILURE, UNSPECIFIED   Status: Acute   Current Visit: 

Yes   


Qualifiers: 


   Heart failure type: systolic 





(5) HTN (hypertension)


SNOMED Code(s): 04920482


   Code(s): I10 - ESSENTIAL (PRIMARY) HYPERTENSION   Status: Chronic   Current 

Visit: Yes   


Qualifiers: 


   Hypertension type: essential hypertension   Qualified Code(s): I10 - 

Essential (primary) hypertension   





(6) Prerenal azotemia


SNOMED Code(s): 327414394


   Code(s): R79.89 - OTHER SPECIFIED ABNORMAL FINDINGS OF BLOOD CHEMISTRY   

Status: Acute   Current Visit: Yes   Annotation/Comment:: Rehydration with NS. 

   





(7) Afib


SNOMED Code(s): 88479966


   Code(s): I48.91 - UNSPECIFIED ATRIAL FIBRILLATION   Status: Acute   Current 

Visit: Yes   


Qualifiers: 


   Atrial fibrillation type: paroxysmal   Qualified Code(s): I48.0 - Paroxysmal 

atrial fibrillation   





(8) INGRID (obstructive sleep apnea)


SNOMED Code(s): 55861661


   Code(s): G47.33 - OBSTRUCTIVE SLEEP APNEA (ADULT) (PEDIATRIC)   Status: 

Acute   Current Visit: Yes   





(9) Valvular heart disease


Status: Acute   Current Visit: Yes   





(10) Osteoarthritis


SNOMED Code(s): 026443957


   Code(s): M19.90 - UNSPECIFIED OSTEOARTHRITIS, UNSPECIFIED SITE   Status: 

Acute   Current Visit: Yes   





(11) IRAJ (acute kidney injury)


SNOMED Code(s): 36445081, 64961037


   Code(s): N17.9 - ACUTE KIDNEY FAILURE, UNSPECIFIED   Status: Acute   Current 

Visit: Yes   





(12) Bacteremia


SNOMED Code(s): 1295478


   Code(s): R78.81 - BACTEREMIA   Status: Acute   Current Visit: Yes   





- Problem List Review


Problem List Initiated/Reviewed/Updated: Yes





- My Orders


Last 24 Hours: 


My Active Orders





10/06/19 13:20


Vital Signs [RC] PER UNIT ROUTINE 





10/07/19 08:41


metOLazone [Zaroxolyn]   2.5 mg PO ONETIME ONE 





10/07/19 09:00


FLU Vacc MG1392-90(6MOS+)/PF [Fluzone Quad 3557-2191 Syringe]   60 mcg IM .ONCE 

ONE 


Furosemide [Lasix]   20 mg IVPUSH BID 





10/07/19 16:00


Warfarin [Coumadin]   2.5 mg PO ONETIME ONE 





10/08/19 05:11


COMPREHENSIVE METABOLIC PN,CMP [CHEM] AM 


PRO B-TYPE NATRIUR PEPT,BNPPRO [CHEM] DAILY 














- Plan


Plan:: 


Continue the Rocephin IV. Encourage IS. Repeat chest x-ray was not changed. I 

am still waiting for final identity of organism. Give Lasix today.Possible 

discharge tomorrow.

## 2019-10-08 RX ADMIN — Medication PRN ML: at 07:56

## 2019-10-08 RX ADMIN — OMEGA-3 FATTY ACIDS CAP 1000 MG SCH GM: 1000 CAP at 07:59

## 2019-10-08 NOTE — PCM.PN
- General Info


Date of Service: 10/08/19


Subjective Update: 


I next feels okay,except for wheezing


Functional Status: Reports: Pain Controlled, Tolerating Diet





- Review of Systems


Pulmonary: Reports: Shortness of Breath, Cough


Cardiovascular: Reports: No Symptoms


Gastrointestinal: Reports: No Symptoms





- Patient Data


Vitals - Most Recent: 


 Last Vital Signs











Temp  98.7 F   10/08/19 07:39


 


Pulse  105 H  10/08/19 07:59


 


Resp  26 H  10/08/19 07:39


 


BP  134/75   10/08/19 07:59


 


Pulse Ox  92 L  10/08/19 07:40











Weight - Most Recent: 87.26 kg


Lab Results Last 24 Hours: 


 Laboratory Results - last 24 hr











  10/08/19 10/08/19 Range/Units





  06:08 06:08 


 


Sodium   136  (135-145)  mmol/L


 


Potassium   4.1  (3.5-5.3)  mmol/L


 


Chloride   100  (100-110)  mmol/L


 


Carbon Dioxide   28  (21-32)  mmol/L


 


BUN   48 H  (7-18)  mg/dL


 


Creatinine   1.5 H  (0.55-1.02)  mg/dL


 


Est Cr Clr Drug Dosing   23.92  mL/min


 


Estimated GFR (MDRD)   33 L  (>60)  


 


BUN/Creatinine Ratio   32.0 H  (9-20)  


 


Glucose   106  ()  mg/dL


 


Calcium   8.8  (8.6-10.2)  mg/dL


 


Total Bilirubin   0.5  (0.1-1.3)  mg/dL


 


AST   92 H D  (5-25)  IU/L


 


ALT   90 H  (12-36)  U/L


 


Alkaline Phosphatase   110  ()  IU/L


 


NT-Pro-B Natriuret Pep  9968 H*   (<=450)  pg/mL


 


Total Protein   6.2  (6.0-8.0)  g/dL


 


Albumin   1.8 L  (3.2-4.6)  g/dL


 


Globulin   4.4  g/dL


 


Albumin/Globulin Ratio   0.4  











Nathan Results Last 24 Hours: 


 Microbiology











 10/03/19 18:55 Aerobic Blood Culture - Preliminary





 Blood - Venous - Lab Draw    NO GROWTH AFTER 4 DAYS





 Anaerobic Blood Culture - Final





    Staphylococcus Aureus


 


 10/03/19 18:50 Aerobic Blood Culture - Preliminary





 Blood - Venous    NO GROWTH AFTER 4 DAYS





 Anaerobic Blood Culture - Preliminary





    NO GROWTH AFTER 4 DAYS











Med Orders - Current: 


 Current Medications





Acetaminophen (Tylenol)  650 mg PO Q4H PRN


   PRN Reason: Pain (Mild 1-3)/fever


   Last Admin: 10/05/19 21:57 Dose:  650 mg


Acetaminophen (Tylenol Extra Strength)  1,000 mg PO WITHDINNER [facility]


   Last Admin: 10/07/19 17:20 Dose:  1,000 mg


Artificial Tears (Refresh Liquigel 1%)  0 ml EYEBOTH BEDTIME [facility]


   Last Admin: 10/07/19 21:24 Dose:  1 drop


Aspirin (Aspirin)  81 mg PO DAILY [facility]


   Last Admin: 10/08/19 07:59 Dose:  81 mg


Atorvastatin Calcium (Lipitor)  60 mg PO BEDTIME [facility]


   Last Admin: 10/07/19 21:23 Dose:  60 mg


Ceftriaxone Sodium (Rocephin)  1 gm IVPUSH Q24H [facility]


   Last Admin: 10/07/19 17:19 Dose:  1 gm


Ezetimibe (Zetia)  10 mg PO DAILY [facility]


   Last Admin: 10/08/19 08:00 Dose:  10 mg


Fish Oil (Fish Oil)  1 gm PO DAILY [facility]


   Last Admin: 10/08/19 07:59 Dose:  1 gm


Furosemide (Lasix)  20 mg IVPUSH BIDDIURETIC [facility]


   Last Admin: 10/08/19 07:57 Dose:  20 mg


Loperamide HCl (Imodium)  2 mg PO ASDIRECTED PRN


   PRN Reason: LOOSE STOOLS


Metoprolol Succinate (Toprol Xl)  50 mg PO DAILY [facility]


   Last Admin: 10/08/19 07:59 Dose:  50 mg


Multivitamins/Minerals/Vitamin C (Tab-A-José Antonio)  1 tab PO DAILY [facility]


   Last Admin: 10/08/19 07:59 Dose:  1 tab


Paroxetine HCl (Paxil)  40 mg PO DAILY [facility]


   Last Admin: 10/08/19 07:59 Dose:  40 mg


Sodium Chloride (Saline Flush)  10 ml FLUSH ASDIRECTED PRN


   PRN Reason: Keep Vein Open


   Last Admin: 10/08/19 07:56 Dose:  10 ml


Tramadol HCl (Ultram)  50 mg PO Q8H PRN


   PRN Reason: Pain


   Last Admin: 10/08/19 05:45 Dose:  50 mg


Warfarin Sodium (Coumadin Sliding Scale)  1 each PO ASDIRECTED [facility]





Discontinued Medications





Furosemide (Lasix)  20 mg PO DAILY [facility]


   Last Admin: 10/07/19 08:19 Dose:  20 mg


Furosemide (Lasix)  40 mg IVPUSH NOW ONE


   Stop: 10/06/19 07:33


   Last Admin: 10/06/19 08:39 Dose:  40 mg


Sodium Chloride (Normal Saline)  1,000 mls @ 500 mls/hr IV ASDIRECTED [facility]


   Last Admin: 10/03/19 16:00 Dose:  500 mls/hr


Azithromycin 500 mg/ Sodium (Chloride)  250 mls @ 250 mls/hr IV Q24H [facility]


   Last Admin: 10/05/19 17:47 Dose:  250 mls/hr


Ceftriaxone Sodium 1 gm/ (Sodium Chloride)  50 mls @ 200 mls/hr IV Q24H [facility]


   Last Admin: 10/03/19 18:17 Dose:  200 mls/hr


Sodium Chloride (Normal Saline)  1,000 mls @ 2,000 mls/hr IV ASDIRECTED [facility]


   Last Infusion: 10/04/19 00:25 Dose:  125 mls/hr


Sodium Chloride (Normal Saline)  1,000 mls @ 125 mls/hr IV ASDIRECTED [facility]


   Last Admin: 10/04/19 07:05 Dose:  125 mls/hr


Influenza Virus Vaccine (Pharmacy To Dose - Influenza Vaccine)  1 each IM 

ONETIME ONE


   Stop: 10/04/19 18:19


Influenza Virus Vaccine (Fluzone Quad 9741-6125 Syringe)  60 mcg IM .ONCE ONE


   Stop: 10/05/19 09:01


   Last Admin: 10/05/19 09:36 Dose:  Not Given


Influenza Virus Vaccine (Fluzone Quad 6216-6294 Syringe)  60 mcg IM .ONCE ONE


   Stop: 10/07/19 09:01


   Last Admin: 10/07/19 09:47 Dose:  Not Given


Warfarin Sodium (Coumadin)  2.5 mg PO 1600 [facility]


   Stop: 10/06/19 16:01


   Last Admin: 10/06/19 16:44 Dose:  2.5 mg


Warfarin Sodium (Coumadin)  2.5 mg PO ONETIME ONE


   Stop: 10/07/19 16:01


   Last Admin: 10/07/19 16:21 Dose:  2.5 mg











- Exam


General: Alert, Oriented


HEENT: Pupils Equal


Lungs: Crackles, Rales


Cardiovascular: Regular Rate, Irregular Rhythm





- Problem List & Annotations


(1) Pneumonia


SNOMED Code(s): 930673106


   Code(s): J18.9 - PNEUMONIA, UNSPECIFIED ORGANISM   Status: Acute   Current 

Visit: Yes   Annotation/Comment:: Admit, start antibx, provide supplemental 02 

  





(2) Knee pain


SNOMED Code(s): 40794399


   Code(s): M25.569 - PAIN IN UNSPECIFIED KNEE   Status: Acute   Current Visit: 

Yes   


Qualifiers: 


   Chronicity: acute   Laterality: left   Qualified Code(s): M25.562 - Pain in 

left knee   





(3) CAD (coronary artery disease)


SNOMED Code(s): 85629752


   Code(s): I25.10 - ATHSCL HEART DISEASE OF NATIVE CORONARY ARTERY W/O ANG 

PCTRS   Status: Chronic   Current Visit: Yes   


Qualifiers: 


   Coronary Disease-Associated Artery/Lesion type: bypass graft   Native vs. 

transplanted heart: native heart   Associated angina: without angina   

Qualified Code(s): I25.810 - Atherosclerosis of coronary artery bypass graft(s) 

without angina pectoris   





(4) CHF (congestive heart failure)


SNOMED Code(s): 93063155


   Code(s): I50.9 - HEART FAILURE, UNSPECIFIED   Status: Acute   Current Visit: 

Yes   


Qualifiers: 


   Heart failure type: systolic 





(5) HTN (hypertension)


SNOMED Code(s): 11369051


   Code(s): I10 - ESSENTIAL (PRIMARY) HYPERTENSION   Status: Chronic   Current 

Visit: Yes   


Qualifiers: 


   Hypertension type: essential hypertension   Qualified Code(s): I10 - 

Essential (primary) hypertension   





(6) Prerenal azotemia


SNOMED Code(s): 751384005


   Code(s): R79.89 - OTHER SPECIFIED ABNORMAL FINDINGS OF BLOOD CHEMISTRY   

Status: Acute   Current Visit: Yes   Annotation/Comment:: Rehydration with NS. 

   





(7) Afib


SNOMED Code(s): 95926026


   Code(s): I48.91 - UNSPECIFIED ATRIAL FIBRILLATION   Status: Acute   Current 

Visit: Yes   


Qualifiers: 


   Atrial fibrillation type: paroxysmal   Qualified Code(s): I48.0 - Paroxysmal 

atrial fibrillation   





(8) INGRID (obstructive sleep apnea)


SNOMED Code(s): 39888870


   Code(s): G47.33 - OBSTRUCTIVE SLEEP APNEA (ADULT) (PEDIATRIC)   Status: 

Acute   Current Visit: Yes   





(9) Valvular heart disease


Status: Acute   Current Visit: Yes   





(10) Osteoarthritis


SNOMED Code(s): 600644123


   Code(s): M19.90 - UNSPECIFIED OSTEOARTHRITIS, UNSPECIFIED SITE   Status: 

Acute   Current Visit: Yes   





(11) IRAJ (acute kidney injury)


SNOMED Code(s): 40075730, 90311833


   Code(s): N17.9 - ACUTE KIDNEY FAILURE, UNSPECIFIED   Status: Acute   Current 

Visit: Yes   





(12) Bacteremia


SNOMED Code(s): 4045301


   Code(s): R78.81 - BACTEREMIA   Status: Acute   Current Visit: Yes   





- Problem List Review


Problem List Initiated/Reviewed/Updated: Yes





- My Orders


Last 24 Hours: 


My Active Orders





10/07/19 09:00


Furosemide [Lasix]   20 mg IVPUSH BIDDIURETIC 














- Plan


Plan:: 


I spoke with infectious disease, in the light of Staphylococcus aureus growing 

in blood. She just suggested a transfer to [facility] for further workup 

including ADELA, CT scans to determine the source and and treatment.

## 2019-10-08 NOTE — DISCH
DISCHARGE DATE:  10/08/2019

 

REASON FOR ADMISSION:

1. Pneumonia.

2. Knee pain.

3. Coronary artery disease.

4. CHF.

5. Hypertension.

6. Atrial fibrillation.

7. Valvular heart disease.

8. Bacteremia.

 

BRIEF HISTORY AND HOSPITAL COURSE:  This is an 84-year-old female, who was

admitted for weakness and found to have pneumonia clinically and by chest x-ray.

She was treated with Rocephin and azithromycin.  Her blood cultures have grown

Staphylococcus aureus, sensitive to methicillin.  The source is unclear, and as

such, transfer has been recommended by Infectious Disease for further workup

including a ADELA, CT scan to determine the course and treatment.  I transferred

the patient this morning.

 

I spent more than 35 minutes in the transfer.

 

Job#: [id number]

DD: 10/08/2019 0903

DT: 10/08/2019 0916 TN/VANIA

## 2020-01-30 ENCOUNTER — HOSPITAL ENCOUNTER (INPATIENT)
Dept: HOSPITAL 7 - FB.MS | Age: 85
LOS: 8 days | Discharge: HOME | DRG: 556 | End: 2020-02-07
Attending: FAMILY MEDICINE | Admitting: FAMILY MEDICINE
Payer: MEDICARE

## 2020-01-30 DIAGNOSIS — G47.33: ICD-10-CM

## 2020-01-30 DIAGNOSIS — I25.10: ICD-10-CM

## 2020-01-30 DIAGNOSIS — M17.12: ICD-10-CM

## 2020-01-30 DIAGNOSIS — I48.20: ICD-10-CM

## 2020-01-30 DIAGNOSIS — Z79.01: ICD-10-CM

## 2020-01-30 DIAGNOSIS — F32.9: ICD-10-CM

## 2020-01-30 DIAGNOSIS — Z95.1: ICD-10-CM

## 2020-01-30 DIAGNOSIS — Z79.82: ICD-10-CM

## 2020-01-30 DIAGNOSIS — N18.3: ICD-10-CM

## 2020-01-30 DIAGNOSIS — Z98.41: ICD-10-CM

## 2020-01-30 DIAGNOSIS — Z95.2: ICD-10-CM

## 2020-01-30 DIAGNOSIS — Z90.49: ICD-10-CM

## 2020-01-30 DIAGNOSIS — Z79.899: ICD-10-CM

## 2020-01-30 DIAGNOSIS — Z96.652: ICD-10-CM

## 2020-01-30 DIAGNOSIS — M79.672: Primary | ICD-10-CM

## 2020-01-30 DIAGNOSIS — R26.2: ICD-10-CM

## 2020-01-30 DIAGNOSIS — Z98.42: ICD-10-CM

## 2020-01-30 DIAGNOSIS — I50.9: ICD-10-CM

## 2020-01-30 DIAGNOSIS — E78.5: ICD-10-CM

## 2020-01-30 DIAGNOSIS — M81.0: ICD-10-CM

## 2020-01-30 RX ADMIN — CARBOXYMETHYLCELLULOSE SODIUM SCH DROP: 10 GEL OPHTHALMIC at 20:05

## 2020-01-30 NOTE — HP
ADMISSION DATE:  2020

 

CHIEF COMPLAINT:  Foot pain, inability to walk.

 

HISTORY OF PRESENT ILLNESS:  Mrs. Aaron is an 84-year-old woman from

[facility] with a history of coronary artery disease post

bypass, aortic valve replacement with prosthetic valve, on warfarin; history of

pleurodesis with cough; congestive heart failure; and atrial fibrillation.

 

According to the patient, she has been having foot pain for approximately 10

days.  She denies injury, but feels it is due to her repetitive dorsiflexion

foot exercise that she has been doing, although she does not have on the right

side.  She was hospitalized up at [facility] for this for several days

and was discharged from there to [facility] today because of her

continued foot pain and inability to walk comfortably.

 

She has not had fever, chills, sweats, or symptoms of infection.  She denies any

redness of the toe, foot, ankle or leg.

 

PAST MEDICAL HISTORY:  Quite extensive.  She had coronary artery bypass graft of

3 vessels in .  She had chronic pleural effusion after this and had talc

pleurodesis in about .  She had aortic valve replacement in  with

mechanical valve and has been on warfarin since.  She has chronic atrial

fibrillation, chronic congestive heart failure, chronic hearing loss.  She is

status post T and A, bilateral cataract surgery, cholecystectomy.  She is

 4, para 4, with normal deliveries and has never had jaundice, hepatitis,

or blood transfusions.  Past history also includes obstructive sleep apnea on

CPAP, chronic eczema, hyperlipidemia, chronic kidney disease stage 3,

osteoarthritis status post left total knee arthroplasty, osteoporosis, and

depression.

 

MEDICATIONS:

1. Warfarin adjusted per INR goal 2.5 to 3.5.

2. Spironolactone 50 mg daily.

3. Paroxetine 40 mg daily.

4. Multiple vitamin 1 daily.

5. Metoprolol tartrate 50 mg daily.

6. She has been on a Medrol Dosepak and is in the last 2 days of it for

    suspected gout.

7. Gabapentin 100 mg b.i.d. p.r.n. pain.

8. Zetia 10 mg daily.

9. Enalapril 2.5 mg tabs 1/2 tab daily.

10.Refresh Tears p.r.n.

11.Bumex 2 mg a.m., 1 mg p.m.

12.Atorvastatin 40 mg at bedtime.

13.Aspirin 81 mg daily.

14.Tylenol p.r.n.

 

ALLERGIES:  None.

 

HABITS:  Nonsmoker and nondrinker.  Occasional 1 cup coffee per day.

 

FAMILY HISTORY:  The patient's father and mother both had heart disease.

Diabetes in 1 sister.

 

SOCIAL HISTORY:  The patient was  2 years ago from her second .

She was  from her first , and he now lives at the [facility].

She had 4 children.  One son  at age 50 of an MI.  Three living children are

in [facility], and [place].  She has 14 grandchildren and 3 great

grandchildren.  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

the [employer].

 

REVIEW OF SYSTEMS:  GENERAL:  No seizures, syncope, or recent significant weight

change.  SKIN:  Negative for rash.  HEENT:  No recent changes in hearing or

vision.  She wears a right-sided hearing aid.  She had good results from her

cataract surgery.  No recent URI, sore throat.  RESPIRATORY:  She does have a

chronic cough that she says has been present ever since her pleurodesis.

CARDIAC:  No chest pain or palpitations.  GASTROINTESTINAL:  No abdominal pain,

nausea, diarrhea, constipation, hematochezia, or melena.  GENITOURINARY:  No

hematuria or UTI symptoms.  MUSCULOSKELETAL:  No joint inflammation.  She does

have chronic swelling of the left lower extremity that preceded this episode of

foot pain.

 

PHYSICAL EXAMINATION:  GENERAL:  She is alert, comfortable, and appears healthy.

She looks younger than her age and is in good spirits.  VITAL SIGNS:  Blood

pressure pending.  SKIN:  Anicteric, warm, dry, without rash.  There is no

erythema or calor about the left foot.  HEENT:  Shows clear TM on the left.  She

has a hearing aid on the right that was not removed for exam.  Pupils are equal

and reactive.  Oropharynx is clear.  LUNGS:  Clear to the bases.  HEART:

Irregular, in atrial fib, with an ejection click and a 2/6 systolic murmur over

the precordium.  ABDOMEN:  Normal bowel sounds.  Soft and nontender.  No masses.

No organomegaly.  EXTREMITIES:  Show 2+ pitting edema to the dorsum of the left

foot extending 1+ up to her upper tibia.  She has a healed left arthroplasty

scar to the left knee.  Right foot has no edema and dorsalis pedis and posterior

tibial pulses are full.

 

LABORATORY:  Pending.

 

ASSESSMENT:  An 84-year-old woman with:

1. Loss of ambulation due to foot pain and swelling, cause unclear, possibly

    gout.

2. Arteriosclerotic heart disease with history of coronary artery bypass

    graft, aortic valve replacement with mechanical valve on anticoagulation.

3. Chronic atrial fibrillation with congestive heart failure.

4. Chronic cough with history of recurrent pleural effusions post bypass and

    pleurodesis in .

5. Chronic hearing loss.

6. Hyperlipidemia.

 

PLAN:  She is admitted to swing bed.  We will provide analgesics as necessary

for foot pain and have a physical therapy and occupational therapy evaluation

done for return of ambulation.

 

I anticipate a short swing bed stay followed by return to her home at [facility] when adequately recuperated.  We will provide palliative care measures for

underlying medical problems while she is here.

 

Job#: [id number]

DD: 2020 1653

DT: 2020 1857 BOBBI/VANIA

## 2020-01-31 RX ADMIN — CARBOXYMETHYLCELLULOSE SODIUM SCH DROP: 10 GEL OPHTHALMIC at 20:53

## 2020-01-31 RX ADMIN — TROLAMINE SALICYLATE PRN APPLIC: 10 CREAM TOPICAL at 16:09

## 2020-02-01 RX ADMIN — TROLAMINE SALICYLATE PRN APPLIC: 10 CREAM TOPICAL at 10:04

## 2020-02-01 RX ADMIN — TROLAMINE SALICYLATE PRN APPLIC: 10 CREAM TOPICAL at 20:11

## 2020-02-01 RX ADMIN — CARBOXYMETHYLCELLULOSE SODIUM SCH DROP: 10 GEL OPHTHALMIC at 20:11

## 2020-02-02 RX ADMIN — CARBOXYMETHYLCELLULOSE SODIUM SCH DROP: 10 GEL OPHTHALMIC at 20:07

## 2020-02-02 RX ADMIN — TROLAMINE SALICYLATE PRN APPLIC: 10 CREAM TOPICAL at 19:41

## 2020-02-03 RX ADMIN — CARBOXYMETHYLCELLULOSE SODIUM SCH DROP: 10 GEL OPHTHALMIC at 20:21

## 2020-02-04 RX ADMIN — CARBOXYMETHYLCELLULOSE SODIUM SCH DROP: 10 GEL OPHTHALMIC at 20:27

## 2020-02-05 RX ADMIN — CARBOXYMETHYLCELLULOSE SODIUM SCH DROP: 10 GEL OPHTHALMIC at 21:01

## 2020-02-06 RX ADMIN — CARBOXYMETHYLCELLULOSE SODIUM SCH DROP: 10 GEL OPHTHALMIC at 20:18

## 2020-02-06 RX ADMIN — TROLAMINE SALICYLATE PRN APPLIC: 10 CREAM TOPICAL at 08:49

## 2020-02-07 NOTE — DISCH
DISCHARGE DATE:  02/07/2020

 

HOSPITAL COURSE:  Zara Aaron is an 84-year-old,  female, history of

coronary artery disease, aortic valve replacement, Coumadin therapy for

prosthetic valve, history of heart failure and atrial fibrillation.  Presented

with complicated severe foot pain. Pain over the dorsum of the foot.  Had been

hospitalized at [facility] for several days and discharged to swing bed

at [place] because of continued foot pain.

 

Please see Dr. Kaufman's admitting examination.

 

LABORATORY STUDIES:  01/31/2020, hemoglobin 9.5, hematocrit 30.2, white count

10,900.  INR 2.21, 1.72, 2.93.  Electrolytes reveal BUN 54, creatinine 1.4, GFR

36.  Uric acid markedly elevated at 8.8.  Calcium 10.4.  Mildly elevated liver

enzymes.

 

During the hospital stay, pain was controlled, medications were adjusted.  We

had the addition of allopurinol 150 mg daily.  Medications improved, symptoms

improved, pain improved.  Ambulation skills were complementary.

 

PHYSICAL EXAMINATION ON DISCHARGE:  VITAL SIGNS:  36.4, 73, 130/70,90,20, and

96.  GENERAL:  Appears comfortable.  Speech was fluent.  NECK:  Benign.  Thyroid

small.  CHEST:  On auscultation, clear all lung fields.  HEART:  Soft murmur.

Irregularly irregular heart beat.  ABDOMEN:  Benign.  EXTREMITIES:  Feet

revealed decreased pulses but present.  Good sensation.  No obvious deformity.

Complicated foot pain, gouty.

 

PLAN:  Much better.

 

DISPOSITION:  Discharged home.

 

MEDICATIONS:  Reviewed, same medications at home.  Coumadin will be adjusted

accordingly.  INR on 02/10/2020.  Addition of allopurinol, Tylenol No. 3, and

gabapentin for pain.

 

FOLLOWUP:  We will follow up with primary care doctor in 1 week's duration.

 

SURGICAL PROCEDURES:  None.

 

CONSULTATION:  None.

 

ADDENDUM:  30-minute discharge planning and treatment.

 

Job#: [id number]

DD: 02/07/2020 1114

DT: 02/07/2020 1355 /VANIA

## 2020-05-04 ENCOUNTER — HOSPITAL ENCOUNTER (INPATIENT)
Dept: HOSPITAL 7 - FB.ED | Age: 85
LOS: 3 days | Discharge: HOME HEALTH | DRG: 683 | End: 2020-05-07
Attending: FAMILY MEDICINE | Admitting: EMERGENCY MEDICINE
Payer: MEDICARE

## 2020-05-04 DIAGNOSIS — Z51.5: ICD-10-CM

## 2020-05-04 DIAGNOSIS — I25.10: ICD-10-CM

## 2020-05-04 DIAGNOSIS — Z95.2: ICD-10-CM

## 2020-05-04 DIAGNOSIS — Z96.652: ICD-10-CM

## 2020-05-04 DIAGNOSIS — I25.810: ICD-10-CM

## 2020-05-04 DIAGNOSIS — L60.5: ICD-10-CM

## 2020-05-04 DIAGNOSIS — R79.1: ICD-10-CM

## 2020-05-04 DIAGNOSIS — M00.9: ICD-10-CM

## 2020-05-04 DIAGNOSIS — I95.9: ICD-10-CM

## 2020-05-04 DIAGNOSIS — Z79.01: ICD-10-CM

## 2020-05-04 DIAGNOSIS — Z95.1: ICD-10-CM

## 2020-05-04 DIAGNOSIS — D64.9: ICD-10-CM

## 2020-05-04 DIAGNOSIS — G47.30: ICD-10-CM

## 2020-05-04 DIAGNOSIS — Z79.899: ICD-10-CM

## 2020-05-04 DIAGNOSIS — R32: ICD-10-CM

## 2020-05-04 DIAGNOSIS — E86.0: ICD-10-CM

## 2020-05-04 DIAGNOSIS — N18.9: ICD-10-CM

## 2020-05-04 DIAGNOSIS — N17.9: Primary | ICD-10-CM

## 2020-05-04 DIAGNOSIS — E86.1: ICD-10-CM

## 2020-05-04 DIAGNOSIS — I50.9: ICD-10-CM

## 2020-05-04 DIAGNOSIS — I48.91: ICD-10-CM

## 2020-05-04 DIAGNOSIS — Z96.659: ICD-10-CM

## 2020-05-04 DIAGNOSIS — I48.0: ICD-10-CM

## 2020-05-04 DIAGNOSIS — I13.0: ICD-10-CM

## 2020-05-04 DIAGNOSIS — Z79.82: ICD-10-CM

## 2020-05-04 DIAGNOSIS — Z98.49: ICD-10-CM

## 2020-05-04 DIAGNOSIS — I95.89: ICD-10-CM

## 2020-05-04 DIAGNOSIS — Z86.14: ICD-10-CM

## 2020-05-04 DIAGNOSIS — M19.90: ICD-10-CM

## 2020-05-04 PROCEDURE — P9016 RBC LEUKOCYTES REDUCED: HCPCS

## 2020-05-04 RX ADMIN — Medication PRN ML: at 15:20

## 2020-05-04 RX ADMIN — POLYVINYL ALCOHOL SCH DROP: 14 SOLUTION/ DROPS OPHTHALMIC at 21:40

## 2020-05-04 NOTE — EDM.PDOC
ED HPI GENERAL MEDICAL PROBLEM





- General


Stated Complaint: DIZZY


Time Seen by Provider: 05/04/20 14:19


Source of Information: Reports: Patient


History Limitations: Reports: No Limitations





- History of Present Illness


INITIAL COMMENTS - FREE TEXT/NARRATIVE: 





84-year-old female who presents from [facility] with three-day history of 

diarrhea and progressive weakness over time. She reports she has had no 

diarrhea today but she has felt weak and dizzy not had much of an appetite. 

Apparently her blood pressure was taken and was 58 systolic and her pulse rate 

was in the 110 range. She has had no nausea or vomiting. She has had no dysuria 

or hematuria. She denies any pain other than her "normal pains" which are 

diffuse joint aches which she reports are no different than previous. She not 

quantitate or qualitate these any more. She has no abdominal pain or cramping. 

She has no chest pain. She has had no cough or difficulty breathing. She does 

report that she has been taking her medications as directed. Her blood pressure 

on arrival here was 138 systolic and a pulse rate of 98. Later on her blood 

pressure did dip into the 80-90 systolic range. She does reports that she is 

thirsty and she feels that her mouth is very dry. She has also been following 

with Dr. Nieves in regard to a left knee infection which has a draining 

sinus. She is currently on Bactrim DS to be treating this and is supposed to be 

following up with her orthopedic surgeon next week. There are no other 

associated signs or symptoms. There are no other modifying factors.


Onset: Other (Last 3-4 days.)


Duration: Getting Worse


Location: Reports: Generalized


Quality: Reports: Ache


Severity: Mild ("Her usual')


Improves with: Reports: Rest


Worsens with: Reports: Other (Standing), Movement


Context: Reports: Other (As above)


Associated Symptoms: Reports: Loss of Appetite, Weakness, Other (As above)


Treatments PTA: Reports: Other (see below) (Nothing)





- Related Data


 Allergies











Allergy/AdvReac Type Severity Reaction Status Date / Time


 


No Known Allergies Allergy   Verified 05/04/20 14:39











Home Meds: 


 Home Meds





Acetaminophen [Acetaminophen Extra Strength] 500 mg PO Q6H PRN 10/04/19 [History

]


Aspirin 81 mg PO DAILY 10/04/19 [History]


Enalapril [Vasotec] 1.25 mg PO DAILY 10/04/19 [History]


Ezetimibe [Zetia] 10 mg PO BEDTIME 10/04/19 [History]


PARoxetine HCl [Paxil] 40 mg PO DAILY 10/04/19 [History]


Bumetanide [Bumex] 1 mg PO BID 01/30/20 [History]


Metoprolol Tartrate 50 mg PO DAILY 01/30/20 [History]


Spironolactone 50 mg PO DAILY 01/30/20 [History]


atorvaSTATin [Lipitor] 40 mg PO BEDTIME 01/30/20 [History]


Gabapentin [Neurontin] 100 mg PO BID 05/04/20 [History]


Polyvinyl Alcohol [Akwa Tears] 1 drop EYEBOTH BEDTIME 05/04/20 [History]


Sulfamethoxazole/Trimethoprim [Septra DS] 1 tab PO BID 05/04/20 [History]


Warfarin [Coumadin] 2.5 mg PO DAILY@2000 05/04/20 [History]


allopurinoL [Zyloprim] 300 mg PO DAILY 05/04/20 [History]











Past Medical History


HEENT History: Reports: Cataract


Cardiovascular History: Reports: Afib, Bypass, CAD, Heart Failure, Heart Valve 

Replacement


Respiratory History: Reports: Sleep Apnea


Genitourinary History: Reports: Urinary Incontinence


Musculoskeletal History: Reports: Arthritis


Hematologic History: Reports: Anticoagulation Therapy (Chronically 

anticoagulated with Coumadin)


Dermatologic History: Reports: Other (See Below)


Other Dermatologic History: Yellow nail syndrome.





- Infectious Disease History


Infectious Disease History: Reports: Chicken Pox, MRSA, Mumps, Other (See Below)


Other Infectious Disease History: History MRSA, ingrown hair, buttock area, 

hospitalized.  Patient states she had measles, unsure if Rubella or Rubeola.





- Past Surgical History


HEENT Surgical History: Reports: Cataract Surgery, Tonsillectomy


Cardiovascular Surgical History: Reports: Coronary Artery Bypass, Other (See 

Below) (Bilateral chest tubes with Solorio Acis following this)


Musculoskeletal Surgical History: Reports: Knee Replacement (Left), Shoulder 

Surgery (Bilateral rotator cuff surgery)





Social & Family History





- Tobacco Use


Smoking Status *Q: Unknown Ever Smoked (Nonsmoker)





- Caffeine Use


Caffeine Use: Reports: Coffee, Soda


Other Caffeine Use: Drinks one cup of decaf coffee everyday, and rarely drinks 

soda.


Caffeine Use Comment: de-caf coffee





- Alcohol Use


Alcohol Use History: No





- Living Situation & Occupation


Living situation: Reports: Other (She lives at [facility] since this 

previous fall.)


Occupation: Retired


Social History Comment: She is here with her daughter





ED ROS GENERAL





- Review of Systems


Review Of Systems: See Below


Constitutional: Reports: Malaise, Weakness, Fatigue, Decreased Appetite


HEENT: Reports: No Symptoms


Respiratory: Reports: No Symptoms


Cardiovascular: Reports: No Symptoms


Endocrine: Reports: No Symptoms


GI/Abdominal: Reports: Diarrhea.  Denies: Black Stool, Bloody Stool, 

Hematochezia


: Reports: No Symptoms


Musculoskeletal: Reports: No Symptoms


Skin: Reports: Wound (Chronic draining sinus on left lower leg/knee area)


Neurological: Reports: No Symptoms


Hematologic/Lymphatic: Reports: Easy Bruising (Chronically anticoagulated on 

Coumadin.)


Immunologic: Reports: No Symptoms





ED EXAM, DIZZINESS





- Physical Exam


Exam: See Below


Exam Limited By: No Limitations


General Appearance: Alert, WD/WN, Mild Distress, Other (She is fluent and 

appropriate in her conversation. She appears nontoxic.)


Eye Exam: Bilateral Eye: EOMI, Normal Inspection, PERRL


Ears: Normal External Exam, Hearing Loss (Chronic)


Nose: Normal Inspection, Normal Mucosa, No Blood


Throat/Mouth: Normal Voice, No Airway Compromise, Other (Dry mucous membranes)


Head Exam: Atraumatic, Normocephalic


Neck: Normal Inspection, Supple, Non-Tender, Full Range of Motion


Respiratory/Chest: No Respiratory Distress, Lungs Clear, Normal Breath Sounds, 

No Accessory Muscle Use, Chest Non-Tender


Cardiovascular: Normal Peripheral Pulses, No JVD, Irregularly Irregular, Other (

Regular rate)


Neurological: Alert, Normal Mood/Affect, Normal Dorsiflexion, Normal Plantar 

Flexion, No Motor/Sensory Deficits, Oriented x 3


Back Exam: Normal Inspection


Extremities: Normal Range of Motion, Normal Capillary Refill, Other (Some 

swelling and tenderness to palpation over the medial proximal left lower leg 

and knee area. There is some erythema)


Skin Exam: Warm, Dry, No Rash, Erythema, Wound/Incision (Draining sinus on left 

knee area.)





EKG INTERPRETATION


EKG Date: 05/04/20


Time: 15:00


Rhythm: A-Fib


Rate (Beats/Min): 102


Axis: Normal


P-Wave: Absent


QRS: RBBB


ST-T: Normal


QT: Prolonged (QTc)


Comparison: No Change (No change from EKG performed on 6/29/2014.)





Course





- Vital Signs


Last Recorded V/S: 


 Last Vital Signs











Temp  36.4 C   05/04/20 16:44


 


Pulse  73   05/04/20 16:44


 


Resp  18   05/04/20 16:44


 


BP  77/45 L  05/04/20 17:16


 


Pulse Ox  93 L  05/04/20 16:50














- Orders/Labs/Meds


Orders: 


 Active Orders 24 hr











 Category Date Time Status


 


 Admission Status [Patient Status] [ADT] Routine ADT  05/04/20 15:46 Active


 


 Height and Weight [RC] 06 Care  05/04/20 15:52 Active


 


 Intake and Output [RC] 06,14,22 Care  05/04/20 15:53 Active


 


 Oxygen Therapy [RC] PRN Care  05/04/20 15:52 Active


 


 Vital Signs [RC] QSHIFT Care  05/04/20 15:52 Active


 


 2 Gram Sodium Diet [DIET] Diet  05/04/20 Dinner Ordered


 


 BASIC METABOLIC PANEL,BMP [CHEM] AM Lab  05/05/20 05:11 Ordered


 


 CBC WITH AUTO DIFF [HEME] AM Lab  05/05/20 05:11 Ordered


 


 INR,PT,PROTHROMBIN TIME [COAG] AM Lab  05/05/20 05:11 Ordered


 


 Acetaminophen [Tylenol] Med  05/04/20 15:52 Active





 650 mg PO Q4H PRN   


 


 Ondansetron [Zofran ODT] Med  05/04/20 15:52 Active





 4 mg PO Q6H PRN   


 


 Ondansetron [Zofran] Med  05/04/20 15:52 Active





 4 mg IVPUSH Q6H PRN   


 


 Sodium Chloride 0.9% [Normal Saline] 1,000 ml Med  05/04/20 16:00 Active





 IV ASDIRECTED   


 


 Sodium Chloride 0.9% [Saline Flush] Med  05/04/20 14:47 Active





 10 ml FLUSH ASDIRECTED PRN   


 


 Peripheral IV Insertion Adult [OM.PC] Routine Oth  05/04/20 14:47 Ordered


 


 Resuscitation Status Routine Resus Stat  05/04/20 15:52 Ordered


 


 EKG 12 Lead [EK] Routine Ther  05/04/20 14:47 Ordered








 Medication Orders





Acetaminophen (Tylenol)  650 mg PO Q4H PRN


   PRN Reason: Pain (Mild 1-3)/fever


Sodium Chloride (Normal Saline)  1,000 mls @ 75 mls/hr IV ASDIRECTED GEETHA


   Last Admin: 05/04/20 16:42  Dose: 75 mls/hr


Ondansetron HCl (Zofran Odt)  4 mg PO Q6H PRN


   PRN Reason: nausea, able to take PO


Ondansetron HCl (Zofran)  4 mg IVPUSH Q6H PRN


   PRN Reason: Nausea/Vomiting


Sodium Chloride (Saline Flush)  10 ml FLUSH ASDIRECTED PRN


   PRN Reason: Keep Vein Open


   Last Admin: 05/04/20 15:20  Dose: 10 ml








Labs: 


 Laboratory Tests











  05/04/20 05/04/20 05/04/20 Range/Units





  14:47 15:10 15:10 


 


WBC   11.7   (4.5-12.0)  X10-3/uL


 


RBC   3.14 L   (3.23-5.20)  x10(6)uL


 


Hgb   9.0 L   (11.5-15.5)  g/dL


 


Hct   27.6 L   (30.0-51.3)  %


 


MCV   88.1   (80-96)  fL


 


MCH   28.8   (27.7-33.6)  pg


 


MCHC   32.7   (32.2-35.4)  g/dL


 


RDW   20.2 H   (11.5-15.5)  %


 


Plt Count   269   (125-369)  X10(3)uL


 


MPV   8.4   (7.4-10.4)  fL


 


Neut % (Auto)   81.0   (46-82)  %


 


Lymph % (Auto)   9.7 L   (13-37)  %


 


Mono % (Auto)   6.3   (4-12)  %


 


Eos % (Auto)   1   (1.0-5.0)  %


 


Baso % (Auto)   2   (0-2)  %


 


Neut # (Auto)   9.6 H   (1.6-8.3)  #


 


Lymph # (Auto)   1.1   (0.6-5.0)  #


 


Mono # (Auto)   0.7   (0.0-1.3)  #


 


Eos # (Auto)   0.1   (0.0-0.8)  #


 


Baso # (Auto)   0.2   (0.0-0.2)  #


 


PT     (9.0-11.1)  sec


 


INR     (1.00-1.24)  


 


Sodium    131 L  (135-145)  mmol/L


 


Potassium    5.1  (3.5-5.3)  mmol/L


 


Chloride    96 L  (100-110)  mmol/L


 


Carbon Dioxide    21  (21-32)  mmol/L


 


BUN    95 H D  (7-18)  mg/dL


 


Creatinine    6.7 H*  (0.55-1.02)  mg/dL


 


Est Cr Clr Drug Dosing    5.12  mL/min


 


Estimated GFR (MDRD)    6 L  (>60)  


 


BUN/Creatinine Ratio    14.2  (9-20)  


 


Glucose    99  ()  mg/dL


 


Calcium    9.3  (8.6-10.2)  mg/dL


 


Magnesium     (1.8-2.5)  mg/dL


 


Total Bilirubin    0.2  (0.1-1.3)  mg/dL


 


AST    28 H D  (5-25)  IU/L


 


ALT    14  D  (12-36)  U/L


 


Alkaline Phosphatase    87  ()  IU/L


 


C-Reactive Protein     (0.5-0.9)  mg/dL


 


Total Protein    7.5  (6.0-8.0)  g/dL


 


Albumin    2.6 L  (3.2-4.6)  g/dL


 


Globulin    4.9  g/dL


 


Albumin/Globulin Ratio    0.5  


 


Urine Color  Yellow    (YELLOW)  


 


Urine Appearance  Clear    (CLEAR)  


 


Urine pH  5.0    (5.0-6.5)  


 


Ur Specific Gravity  1.025    (1.010-1.025)  


 


Urine Protein  Negative    (NEGATIVE)  mg/dL


 


Urine Glucose (UA)  Normal    (NORMAL)  mg/dL


 


Urine Ketones  Negative    (NEGATIVE)  mg/dL


 


Urine Occult Blood  Moderate H    (NEGATIVE)  


 


Urine Nitrite  Negative    (NEGATIVE)  


 


Urine Bilirubin  Small H    (NEGATIVE)  


 


Urine Urobilinogen  Normal    (NEGATIVE)  mg/dL


 


Ur Leukocyte Esterase  Negative    (NEGATIVE)  


 


Urine RBC  0-5    (0-5)  


 


Urine WBC  0-5    (0-5)  


 


Ur Squamous Epith Cells  Rare    (NS,R,O)  


 


Amorphous Sediment  Few    


 


Urine Bacteria  Rare H    (NS)  














  05/04/20 05/04/20 05/04/20 Range/Units





  15:10 15:10 15:10 


 


WBC     (4.5-12.0)  X10-3/uL


 


RBC     (3.23-5.20)  x10(6)uL


 


Hgb     (11.5-15.5)  g/dL


 


Hct     (30.0-51.3)  %


 


MCV     (80-96)  fL


 


MCH     (27.7-33.6)  pg


 


MCHC     (32.2-35.4)  g/dL


 


RDW     (11.5-15.5)  %


 


Plt Count     (125-369)  X10(3)uL


 


MPV     (7.4-10.4)  fL


 


Neut % (Auto)     (46-82)  %


 


Lymph % (Auto)     (13-37)  %


 


Mono % (Auto)     (4-12)  %


 


Eos % (Auto)     (1.0-5.0)  %


 


Baso % (Auto)     (0-2)  %


 


Neut # (Auto)     (1.6-8.3)  #


 


Lymph # (Auto)     (0.6-5.0)  #


 


Mono # (Auto)     (0.0-1.3)  #


 


Eos # (Auto)     (0.0-0.8)  #


 


Baso # (Auto)     (0.0-0.2)  #


 


PT    51.4 H*  (9.0-11.1)  sec


 


INR    5.31 H*  (1.00-1.24)  


 


Sodium     (135-145)  mmol/L


 


Potassium     (3.5-5.3)  mmol/L


 


Chloride     (100-110)  mmol/L


 


Carbon Dioxide     (21-32)  mmol/L


 


BUN     (7-18)  mg/dL


 


Creatinine     (0.55-1.02)  mg/dL


 


Est Cr Clr Drug Dosing     mL/min


 


Estimated GFR (MDRD)     (>60)  


 


BUN/Creatinine Ratio     (9-20)  


 


Glucose     ()  mg/dL


 


Calcium     (8.6-10.2)  mg/dL


 


Magnesium   2.4   (1.8-2.5)  mg/dL


 


Total Bilirubin     (0.1-1.3)  mg/dL


 


AST     (5-25)  IU/L


 


ALT     (12-36)  U/L


 


Alkaline Phosphatase     ()  IU/L


 


C-Reactive Protein  20.2 H*    (0.5-0.9)  mg/dL


 


Total Protein     (6.0-8.0)  g/dL


 


Albumin     (3.2-4.6)  g/dL


 


Globulin     g/dL


 


Albumin/Globulin Ratio     


 


Urine Color     (YELLOW)  


 


Urine Appearance     (CLEAR)  


 


Urine pH     (5.0-6.5)  


 


Ur Specific Gravity     (1.010-1.025)  


 


Urine Protein     (NEGATIVE)  mg/dL


 


Urine Glucose (UA)     (NORMAL)  mg/dL


 


Urine Ketones     (NEGATIVE)  mg/dL


 


Urine Occult Blood     (NEGATIVE)  


 


Urine Nitrite     (NEGATIVE)  


 


Urine Bilirubin     (NEGATIVE)  


 


Urine Urobilinogen     (NEGATIVE)  mg/dL


 


Ur Leukocyte Esterase     (NEGATIVE)  


 


Urine RBC     (0-5)  


 


Urine WBC     (0-5)  


 


Ur Squamous Epith Cells     (NS,R,O)  


 


Amorphous Sediment     


 


Urine Bacteria     (NS)  











Meds: 


Medications











Generic Name Dose Route Start Last Admin





  Trade Name Freq  PRN Reason Stop Dose Admin


 


Acetaminophen  650 mg  05/04/20 15:52  





  Tylenol  PO   





  Q4H PRN   





  Pain (Mild 1-3)/fever   





     





     





     


 


Sodium Chloride  1,000 mls @ 75 mls/hr  05/04/20 16:00  05/04/20 16:42





  Normal Saline  IV   75 mls/hr





  ASDIRECTED GEETHA   Administration





     





     





     





     


 


Ondansetron HCl  4 mg  05/04/20 15:52  





  Zofran Odt  PO   





  Q6H PRN   





  nausea, able to take PO   





     





     





     


 


Ondansetron HCl  4 mg  05/04/20 15:52  





  Zofran  IVPUSH   





  Q6H PRN   





  Nausea/Vomiting   





     





     





     


 


Sodium Chloride  10 ml  05/04/20 14:47  05/04/20 15:20





  Saline Flush  FLUSH   10 ml





  ASDIRECTED PRN   Administration





  Keep Vein Open   





     





     





     














Discontinued Medications














Generic Name Dose Route Start Last Admin





  Trade Name Freq  PRN Reason Stop Dose Admin


 


Sodium Chloride  500 mls @ 999 mls/hr  05/04/20 14:51  05/04/20 15:20





  Normal Saline  IV  05/04/20 15:21  999 mls/hr





  .BOLUS ONE   Administration





     





     





     





     














- Re-Assessments/Exams


Free Text/Narrative Re-Assessment/Exam: 





05/04/20 15:40: The patient's creatinine is 6.7. Her INR required diluting. Her 

blood pressure has routinely been in the 80 systolic range. I am giving the 

patient normal saline 500 mL bolus but she has acute renal failure probably 

associated with it and dehydration and she will also be supratherapeutic on her 

INR. She will need admission for IV fluid hydration and fluid resuscitation. I 

discussed this with the patient and with her family and the patient was 

initially not wanting to be admitted but after discussing it with her and with 

her daughter in the room she has agreed to admission. She will need a greater 

then 2 midnight hospital stay to accomplish the plan of care for correction of 

her dehydration and acute kidney injury and the patient has reiterated and the 

daughter has reiterated that she is a DNR/DNI really wants no aggressive cares 

but supportive cares. I will call and discussed patient's case with Dr. Cha.





05/04/20 15:50: I discussed the patient's case with Dr. Cha and he has 

agreed to admit the patient. I will place interim admission orders and he will 

be seeing the patient.








Departure





- Departure


Time of Disposition: 16:25


Disposition: Admitted As Inpatient 68


Condition: Fair


Clinical Impression: 


 Severe dehydration, Supratherapeutic INR, Chronic infection of left knee





Acute renal failure


Qualifiers:


 Acute renal failure type: unspecified Qualified Code(s): N17.9 - Acute kidney 

failure, unspecified





Hypotension


Qualifiers:


 Hypotension type: hypotension due to hypovolemia Qualified Code(s): I95.89 - 

Other hypotension; E86.1 - Hypovolemia








- Discharge Information





Sepsis Event Note





- Focused Exam


Vital Signs: 


 Vital Signs











  Temp Pulse Resp BP Pulse Ox


 


 05/04/20 14:10  36.7 C  98  18  138/72  94 L











Date Exam was Performed: 05/04/20


Time Exam was Performed: 18:36





- My Orders


Last 24 Hours: 


My Active Orders





05/04/20 14:47


Sodium Chloride 0.9% [Saline Flush]   10 ml FLUSH ASDIRECTED PRN 


Peripheral IV Insertion Adult [OM.PC] Routine 


EKG 12 Lead [EK] Routine 





05/04/20 15:46


Admission Status [Patient Status] [ADT] Routine 





05/04/20 15:52


Height and Weight [RC] 06 


Oxygen Therapy [RC] PRN 


Vital Signs [RC] QSHIFT 


Acetaminophen [Tylenol]   650 mg PO Q4H PRN 


Ondansetron [Zofran ODT]   4 mg PO Q6H PRN 


Ondansetron [Zofran]   4 mg IVPUSH Q6H PRN 


Resuscitation Status Routine 





05/04/20 15:53


Intake and Output [RC] 06,14,22 





05/04/20 16:00


Sodium Chloride 0.9% [Normal Saline] 1,000 ml IV ASDIRECTED 





05/04/20 Dinner


2 Gram Sodium Diet [DIET] 





05/05/20 05:11


BASIC METABOLIC PANEL,BMP [CHEM] AM 


CBC WITH AUTO DIFF [HEME] AM 


INR,PT,PROTHROMBIN TIME [COAG] AM 














- Assessment/Plan


Last 24 Hours: 


My Active Orders





05/04/20 14:47


Sodium Chloride 0.9% [Saline Flush]   10 ml FLUSH ASDIRECTED PRN 


Peripheral IV Insertion Adult [OM.PC] Routine 


EKG 12 Lead [EK] Routine 





05/04/20 15:46


Admission Status [Patient Status] [ADT] Routine 





05/04/20 15:52


Height and Weight [RC] 06 


Oxygen Therapy [RC] PRN 


Vital Signs [RC] QSHIFT 


Acetaminophen [Tylenol]   650 mg PO Q4H PRN 


Ondansetron [Zofran ODT]   4 mg PO Q6H PRN 


Ondansetron [Zofran]   4 mg IVPUSH Q6H PRN 


Resuscitation Status Routine 





05/04/20 15:53


Intake and Output [RC] 06,14,22 





05/04/20 16:00


Sodium Chloride 0.9% [Normal Saline] 1,000 ml IV ASDIRECTED 





05/04/20 Dinner


2 Gram Sodium Diet [DIET] 





05/05/20 05:11


BASIC METABOLIC PANEL,BMP [CHEM] AM 


CBC WITH AUTO DIFF [HEME] AM 


INR,PT,PROTHROMBIN TIME [COAG] AM

## 2020-05-04 NOTE — PCM.HP.2
H&P History of Present Illness





- General


Date of Service: 05/04/20


Admit Problem/Dx: 


 Admission Diagnosis/Problem





Admission Diagnosis/Problem      Acute renal failure








Source of Information: Patient


History Limitations: Reports: No Limitations





- History of Present Illness


Initial Comments - Free Text/Narative: 


Zara is a 85 yo female who came to ED with generalized weakness,hypotension and 

dizziness of a day's duration.She had also had some loose stools for 3 days.No 

fever or chest pain.Zara has h/o CHF,Mitral stenosis,CKD and recent wound to 

the left knee.


  ** Generalized


Pain Score (Numeric/FACES): 0





- Related Data


Allergies/Adverse Reactions: 


 Allergies











Allergy/AdvReac Type Severity Reaction Status Date / Time


 


No Known Allergies Allergy   Verified 05/04/20 14:39











Home Medications: 


 Home Meds





Acetaminophen [Acetaminophen Extra Strength] 500 mg PO Q6H PRN 10/04/19 [History

]


Aspirin 81 mg PO DAILY 10/04/19 [History]


Enalapril [Vasotec] 1.25 mg PO DAILY 10/04/19 [History]


Ezetimibe [Zetia] 10 mg PO BEDTIME 10/04/19 [History]


PARoxetine HCl [Paxil] 40 mg PO DAILY 10/04/19 [History]


Bumetanide [Bumex] 1 mg PO BID 01/30/20 [History]


Metoprolol Tartrate 50 mg PO DAILY 01/30/20 [History]


Spironolactone 50 mg PO DAILY 01/30/20 [History]


atorvaSTATin [Lipitor] 40 mg PO BEDTIME 01/30/20 [History]


Gabapentin [Neurontin] 100 mg PO BID 05/04/20 [History]


Polyvinyl Alcohol [Akwa Tears] 1 drop EYEBOTH BEDTIME 05/04/20 [History]


Sulfamethoxazole/Trimethoprim [Septra DS] 1 tab PO BID 05/04/20 [History]


Warfarin [Coumadin] 2.5 mg PO DAILY@2000 05/04/20 [History]


allopurinoL [Zyloprim] 300 mg PO DAILY 05/04/20 [History]











Past Medical History


HEENT History: Reports: Cataract


Cardiovascular History: Reports: Bypass, Heart Failure, Heart Valve Replacement


Respiratory History: Reports: Sleep Apnea, Other (See Below)


Other Respiratory History: Put powder in both lungs for fluid, from CHF.  Has a 

frequent cough.


OB/GYN History: Reports: Pregnancy


Musculoskeletal History: Reports: Arthritis


Dermatologic History: Reports: Other (See Below)


Other Dermatologic History: Yellow nail syndrome.





- Infectious Disease History


Infectious Disease History: Reports: Chicken Pox, MRSA, Mumps, Other (See Below)


Other Infectious Disease History: History MRSA, ingrown hair, buttock area, 

hospitalized.  Patient states she had measles, unsure if Rubella or Rubeola.





- Past Surgical History


HEENT Surgical History: Reports: Cataract Surgery


Musculoskeletal Surgical History: Reports: Knee Replacement, Shoulder Surgery





Social & Family History





- Family History


Family Medical History: Noncontributory





- Tobacco Use


Smoking Status *Q: Never Smoker





- Caffeine Use


Caffeine Use: Reports: Coffee


Other Caffeine Use: Drinks one cup of decaf coffee everyday, and rarely drinks 

soda.


Caffeine Use Comment: Decaf coffee





- Recreational Drug Use


Recreational Drug Use: No





H&P Review of Systems





- Review of Systems:


Review Of Systems: Comprehensive ROS is negative, except as noted in HPI.





Exam





- Exam


Exam: See Below





- Vital Signs


Vital Signs: 


 Last Vital Signs











Temp  97.5 F   05/04/20 16:44


 


Pulse  73   05/04/20 16:44


 


Resp  18   05/04/20 16:44


 


BP  77/45 L  05/04/20 17:16


 


Pulse Ox  93 L  05/04/20 16:50











Weight: 69.037 kg





- Exam


General: Alert, Oriented, 4


HEENT: PERRLA, Hearing Intact, Mucosa Moist & Pink, Nares Patent, Normal Nasal 

Septum, Posterior Pharynx Clear, Conjunctiva Clear, EOMI, EACs Clear, TMs Clear


Neck: Supple, Trachea Midline, 2


Lungs: Clear to Auscultation, Normal Respiratory Effort


Cardiovascular: Regular Rate, Regular Rhythm


GI/Abdominal Exam: Normal Bowel Sounds, Soft, Non-Tender, No Organomegaly, No 

Distention, No Abnormal Bruit, No Mass, Pelvis Stable


 (Female) Exam: Deferred


Rectal (Female) Exam: Deferred


Back Exam: Normal Inspection, Full Range of Motion, NT


Extremities: Normal Inspection, Normal Range of Motion, Non-Tender, No Pedal 

Edema, Normal Capillary Refill


Skin: Warm, Dry, Intact


Neurological: Cranial Nerves Intact, Reflexes Equal Bilateral


Neuro Extensive - Mental Status: Alert, Oriented x3, Normal Mood/Affect, Normal 

Cognition


Neuro Extensive - Motor, Sensory, Reflexes: CN II-XII Intact, Normal Gait, 

Normal Reflexes


Psychiatric: Alert, Normal Affect, Normal Mood





- Patient Data


Lab Results Last 24 hrs: 


 Laboratory Results - last 24 hr











  05/04/20 05/04/20 05/04/20 Range/Units





  14:47 15:10 15:10 


 


WBC   11.7   (4.5-12.0)  X10-3/uL


 


RBC   3.14 L   (3.23-5.20)  x10(6)uL


 


Hgb   9.0 L   (11.5-15.5)  g/dL


 


Hct   27.6 L   (30.0-51.3)  %


 


MCV   88.1   (80-96)  fL


 


MCH   28.8   (27.7-33.6)  pg


 


MCHC   32.7   (32.2-35.4)  g/dL


 


RDW   20.2 H   (11.5-15.5)  %


 


Plt Count   269   (125-369)  X10(3)uL


 


MPV   8.4   (7.4-10.4)  fL


 


Neut % (Auto)   81.0   (46-82)  %


 


Lymph % (Auto)   9.7 L   (13-37)  %


 


Mono % (Auto)   6.3   (4-12)  %


 


Eos % (Auto)   1   (1.0-5.0)  %


 


Baso % (Auto)   2   (0-2)  %


 


Neut # (Auto)   9.6 H   (1.6-8.3)  #


 


Lymph # (Auto)   1.1   (0.6-5.0)  #


 


Mono # (Auto)   0.7   (0.0-1.3)  #


 


Eos # (Auto)   0.1   (0.0-0.8)  #


 


Baso # (Auto)   0.2   (0.0-0.2)  #


 


PT     (9.0-11.1)  sec


 


INR     (1.00-1.24)  


 


Sodium    131 L  (135-145)  mmol/L


 


Potassium    5.1  (3.5-5.3)  mmol/L


 


Chloride    96 L  (100-110)  mmol/L


 


Carbon Dioxide    21  (21-32)  mmol/L


 


BUN    95 H D  (7-18)  mg/dL


 


Creatinine    6.7 H*  (0.55-1.02)  mg/dL


 


Est Cr Clr Drug Dosing    5.12  mL/min


 


Estimated GFR (MDRD)    6 L  (>60)  


 


BUN/Creatinine Ratio    14.2  (9-20)  


 


Glucose    99  ()  mg/dL


 


Calcium    9.3  (8.6-10.2)  mg/dL


 


Magnesium     (1.8-2.5)  mg/dL


 


Total Bilirubin    0.2  (0.1-1.3)  mg/dL


 


AST    28 H D  (5-25)  IU/L


 


ALT    14  D  (12-36)  U/L


 


Alkaline Phosphatase    87  ()  IU/L


 


C-Reactive Protein     (0.5-0.9)  mg/dL


 


Total Protein    7.5  (6.0-8.0)  g/dL


 


Albumin    2.6 L  (3.2-4.6)  g/dL


 


Globulin    4.9  g/dL


 


Albumin/Globulin Ratio    0.5  


 


Urine Color  Yellow    (YELLOW)  


 


Urine Appearance  Clear    (CLEAR)  


 


Urine pH  5.0    (5.0-6.5)  


 


Ur Specific Gravity  1.025    (1.010-1.025)  


 


Urine Protein  Negative    (NEGATIVE)  mg/dL


 


Urine Glucose (UA)  Normal    (NORMAL)  mg/dL


 


Urine Ketones  Negative    (NEGATIVE)  mg/dL


 


Urine Occult Blood  Moderate H    (NEGATIVE)  


 


Urine Nitrite  Negative    (NEGATIVE)  


 


Urine Bilirubin  Small H    (NEGATIVE)  


 


Urine Urobilinogen  Normal    (NEGATIVE)  mg/dL


 


Ur Leukocyte Esterase  Negative    (NEGATIVE)  


 


Urine RBC  0-5    (0-5)  


 


Urine WBC  0-5    (0-5)  


 


Ur Squamous Epith Cells  Rare    (NS,R,O)  


 


Amorphous Sediment  Few    


 


Urine Bacteria  Rare H    (NS)  














  05/04/20 05/04/20 05/04/20 Range/Units





  15:10 15:10 15:10 


 


WBC     (4.5-12.0)  X10-3/uL


 


RBC     (3.23-5.20)  x10(6)uL


 


Hgb     (11.5-15.5)  g/dL


 


Hct     (30.0-51.3)  %


 


MCV     (80-96)  fL


 


MCH     (27.7-33.6)  pg


 


MCHC     (32.2-35.4)  g/dL


 


RDW     (11.5-15.5)  %


 


Plt Count     (125-369)  X10(3)uL


 


MPV     (7.4-10.4)  fL


 


Neut % (Auto)     (46-82)  %


 


Lymph % (Auto)     (13-37)  %


 


Mono % (Auto)     (4-12)  %


 


Eos % (Auto)     (1.0-5.0)  %


 


Baso % (Auto)     (0-2)  %


 


Neut # (Auto)     (1.6-8.3)  #


 


Lymph # (Auto)     (0.6-5.0)  #


 


Mono # (Auto)     (0.0-1.3)  #


 


Eos # (Auto)     (0.0-0.8)  #


 


Baso # (Auto)     (0.0-0.2)  #


 


PT    51.4 H*  (9.0-11.1)  sec


 


INR    5.31 H*  (1.00-1.24)  


 


Sodium     (135-145)  mmol/L


 


Potassium     (3.5-5.3)  mmol/L


 


Chloride     (100-110)  mmol/L


 


Carbon Dioxide     (21-32)  mmol/L


 


BUN     (7-18)  mg/dL


 


Creatinine     (0.55-1.02)  mg/dL


 


Est Cr Clr Drug Dosing     mL/min


 


Estimated GFR (MDRD)     (>60)  


 


BUN/Creatinine Ratio     (9-20)  


 


Glucose     ()  mg/dL


 


Calcium     (8.6-10.2)  mg/dL


 


Magnesium   2.4   (1.8-2.5)  mg/dL


 


Total Bilirubin     (0.1-1.3)  mg/dL


 


AST     (5-25)  IU/L


 


ALT     (12-36)  U/L


 


Alkaline Phosphatase     ()  IU/L


 


C-Reactive Protein  20.2 H*    (0.5-0.9)  mg/dL


 


Total Protein     (6.0-8.0)  g/dL


 


Albumin     (3.2-4.6)  g/dL


 


Globulin     g/dL


 


Albumin/Globulin Ratio     


 


Urine Color     (YELLOW)  


 


Urine Appearance     (CLEAR)  


 


Urine pH     (5.0-6.5)  


 


Ur Specific Gravity     (1.010-1.025)  


 


Urine Protein     (NEGATIVE)  mg/dL


 


Urine Glucose (UA)     (NORMAL)  mg/dL


 


Urine Ketones     (NEGATIVE)  mg/dL


 


Urine Occult Blood     (NEGATIVE)  


 


Urine Nitrite     (NEGATIVE)  


 


Urine Bilirubin     (NEGATIVE)  


 


Urine Urobilinogen     (NEGATIVE)  mg/dL


 


Ur Leukocyte Esterase     (NEGATIVE)  


 


Urine RBC     (0-5)  


 


Urine WBC     (0-5)  


 


Ur Squamous Epith Cells     (NS,R,O)  


 


Amorphous Sediment     


 


Urine Bacteria     (NS)  











Result Diagrams: 


 05/05/20 06:35





 05/05/20 06:35


Nathan Results Last 24 hrs: 


 Microbiology











 05/04/20 16:38 C. difficile Antigen & Toxins A,B - Final





 Stool / Feces 














Sepsis Event Note





- Evaluation


Sepsis Screening Result: No Definite Risk





- Focused Exam


Vital Signs: 


 Vital Signs











  Temp Pulse Resp BP Pulse Ox Pulse Ox


 


 05/04/20 17:16     77/45 L  


 


 05/04/20 16:50       93 L


 


 05/04/20 16:44  97.5 F  73  18  82/46 L  93 L 


 


 05/04/20 14:10  98.0 F  98  18  138/72  94 L 











Date Exam was Performed: 05/05/20


Time Exam was Performed: 08:40





- Problem List


(1) Acute renal failure


SNOMED Code(s): 00902600


   ICD Code: N17.9 - ACUTE KIDNEY FAILURE, UNSPECIFIED   Status: Acute   

Current Visit: Yes   


Qualifiers: 


   Acute renal failure type: unspecified   Qualified Code(s): N17.9 - Acute 

kidney failure, unspecified   





(2) Palliative care status


SNOMED Code(s): 443298297


   ICD Code: Z51.5 - ENCOUNTER FOR PALLIATIVE CARE   Status: Acute   Current 

Visit: Yes   





(3) Chronic infection of left knee


SNOMED Code(s): 891981368, 927121960


   ICD Code: M00.9 - PYOGENIC ARTHRITIS, UNSPECIFIED   Status: Chronic   

Current Visit: Yes   





(4) Hypotension


SNOMED Code(s): 16852868


   ICD Code: I95.9 - HYPOTENSION, UNSPECIFIED   Status: Acute   Current Visit: 

Yes   


Qualifiers: 


   Hypotension type: hypotension due to hypovolemia   Qualified Code(s): I95.89 

- Other hypotension; E86.1 - Hypovolemia   





(5) Severe dehydration


SNOMED Code(s): 578324200


   ICD Code: E86.0 - DEHYDRATION   Status: Acute   Current Visit: Yes   





(6) Afib


SNOMED Code(s): 65161881


   ICD Code: I48.91 - UNSPECIFIED ATRIAL FIBRILLATION   Status: Acute   Current 

Visit: No   


Qualifiers: 


   Atrial fibrillation type: paroxysmal   Qualified Code(s): I48.0 - Paroxysmal 

atrial fibrillation   





(7) Osteoarthritis


SNOMED Code(s): 590221750


   ICD Code: M19.90 - UNSPECIFIED OSTEOARTHRITIS, UNSPECIFIED SITE   Status: 

Acute   Current Visit: No   





(8) Valvular heart disease


Status: Acute   Current Visit: No   





(9) CAD (coronary artery disease)


SNOMED Code(s): 52319565


   ICD Code: I25.10 - ATHSCL HEART DISEASE OF NATIVE CORONARY ARTERY W/O ANG 

PCTRS   Status: Chronic   Current Visit: No   


Qualifiers: 


   Coronary Disease-Associated Artery/Lesion type: bypass graft   Native vs. 

transplanted heart: native heart   Associated angina: without angina   

Qualified Code(s): I25.810 - Atherosclerosis of coronary artery bypass graft(s) 

without angina pectoris   





(10) HTN (hypertension)


SNOMED Code(s): 59875380


   ICD Code: I10 - ESSENTIAL (PRIMARY) HYPERTENSION   Status: Chronic   Current 

Visit: No   


Qualifiers: 


   Hypertension type: essential hypertension   Qualified Code(s): I10 - 

Essential (primary) hypertension   


Problem List Initiated/Reviewed/Updated: Yes


Orders Last 24hrs: 


 Active Orders 24 hr











 Category Date Time Status


 


 Admission Status [Patient Status] [ADT] Routine ADT  05/04/20 15:46 Active


 


 Height and Weight [RC] 06 Care  05/04/20 15:52 Active


 


 Intake and Output [RC] 06,14,22 Care  05/04/20 15:53 Active


 


 Oxygen Therapy [RC] PRN Care  05/04/20 15:52 Active


 


 Vital Signs [RC] QSHIFT Care  05/04/20 15:52 Active


 


 2 Gram Sodium Diet [DIET] Diet  05/04/20 Dinner Ordered


 


 BASIC METABOLIC PANEL,BMP [CHEM] AM Lab  05/05/20 05:11 Ordered


 


 CBC WITH AUTO DIFF [HEME] AM Lab  05/05/20 05:11 Ordered


 


 INR,PT,PROTHROMBIN TIME [COAG] AM Lab  05/05/20 05:11 Ordered


 


 Acetaminophen [Tylenol Extra Strength] Med  05/04/20 18:35 Ordered





 500 mg PO Q6H PRN   


 


 Acetaminophen [Tylenol] Med  05/04/20 15:52 Active





 650 mg PO Q4H PRN   


 


 Aspirin Med  05/05/20 09:00 Ordered





 81 mg PO DAILY   


 


 Ondansetron [Zofran ODT] Med  05/04/20 15:52 Active





 4 mg PO Q6H PRN   


 


 Ondansetron [Zofran] Med  05/04/20 15:52 Active





 4 mg IVPUSH Q6H PRN   


 


 PARoxetine HCl [Paxil] Med  05/05/20 09:00 Ordered





 40 mg PO DAILY   


 


 Polyvinyl Alcohol [LiquiTears 1.4% Ophth Soln] Med  05/04/20 21:00 Ordered





 1 drop EYEBOTH BEDTIME   


 


 Sodium Chloride 0.9% [Normal Saline] 1,000 ml Med  05/04/20 16:00 Active





 IV ASDIRECTED   


 


 Sodium Chloride 0.9% [Saline Flush] Med  05/04/20 14:47 Active





 10 ml FLUSH ASDIRECTED PRN   


 


 Sulfamethoxazole/Trimethoprim [Septra DS] Med  05/05/20 09:00 Ordered





 1 tab PO DAILY   


 


 Peripheral IV Insertion Adult [OM.PC] Routine Oth  05/04/20 14:47 Ordered


 


 Resuscitation Status Routine Resus Stat  05/04/20 15:52 Ordered


 


 EKG 12 Lead [EK] Routine Ther  05/04/20 14:47 Ordered








 Medication Orders





Acetaminophen (Tylenol)  650 mg PO Q4H PRN


   PRN Reason: Pain (Mild 1-3)/fever


Sodium Chloride (Normal Saline)  1,000 mls @ 75 mls/hr IV ASDIRECTED GEETHA


   Last Admin: 05/04/20 16:42  Dose: 75 mls/hr


Ondansetron HCl (Zofran Odt)  4 mg PO Q6H PRN


   PRN Reason: nausea, able to take PO


Ondansetron HCl (Zofran)  4 mg IVPUSH Q6H PRN


   PRN Reason: Nausea/Vomiting


Sodium Chloride (Saline Flush)  10 ml FLUSH ASDIRECTED PRN


   PRN Reason: Keep Vein Open


   Last Admin: 05/04/20 15:20  Dose: 10 ml








Assessment/Plan Comment:: 


Continue rehydration.Hold antihypertensives and diuretics.

## 2020-05-05 RX ADMIN — Medication PRN ML: at 23:37

## 2020-05-05 RX ADMIN — POLYVINYL ALCOHOL SCH DROP: 14 SOLUTION/ DROPS OPHTHALMIC at 20:24

## 2020-05-05 RX ADMIN — DIPHENOXYLATE HYDROCHLORIDE AND ATROPINE SULFATE PRN TAB: 2.5; .025 TABLET ORAL at 15:49

## 2020-05-05 NOTE — PCM.PN
- General Info


Date of Service: 05/05/20


Subjective Update: 


Complains of lightheadedness when she gets up.


Functional Status: Reports: Pain Controlled





- Review of Systems


General: Reports: Weakness


HEENT: Reports: No Symptoms


Pulmonary: Reports: No Symptoms


Cardiovascular: Reports: No Symptoms


Gastrointestinal: Reports: No Symptoms


Genitourinary: Reports: No Symptoms





- Patient Data


Vitals - Most Recent: 


 Last Vital Signs











Temp  98.1 F   05/05/20 08:00


 


Pulse  97   05/05/20 08:00


 


Resp  18   05/05/20 08:00


 


BP  81/44 L  05/05/20 08:00


 


Pulse Ox  96   05/05/20 08:00











Weight - Most Recent: 69.037 kg


I&O - Last 24 Hours: 


 Intake & Output











 05/04/20 05/05/20 05/05/20





 22:59 06:59 14:59


 


Intake Total 894 844 


 


Output Total   150


 


Balance 894 844 -150











Lab Results Last 24 Hours: 


 Laboratory Results - last 24 hr











  05/04/20 05/04/20 05/04/20 Range/Units





  14:47 15:10 15:10 


 


WBC   11.7   (4.5-12.0)  X10-3/uL


 


RBC   3.14 L   (3.23-5.20)  x10(6)uL


 


Hgb   9.0 L   (11.5-15.5)  g/dL


 


Hct   27.6 L   (30.0-51.3)  %


 


MCV   88.1   (80-96)  fL


 


MCH   28.8   (27.7-33.6)  pg


 


MCHC   32.7   (32.2-35.4)  g/dL


 


RDW   20.2 H   (11.5-15.5)  %


 


Plt Count   269   (125-369)  X10(3)uL


 


MPV   8.4   (7.4-10.4)  fL


 


Neut % (Auto)   81.0   (46-82)  %


 


Lymph % (Auto)   9.7 L   (13-37)  %


 


Mono % (Auto)   6.3   (4-12)  %


 


Eos % (Auto)   1   (1.0-5.0)  %


 


Baso % (Auto)   2   (0-2)  %


 


Neut # (Auto)   9.6 H   (1.6-8.3)  #


 


Lymph # (Auto)   1.1   (0.6-5.0)  #


 


Mono # (Auto)   0.7   (0.0-1.3)  #


 


Eos # (Auto)   0.1   (0.0-0.8)  #


 


Baso # (Auto)   0.2   (0.0-0.2)  #


 


Add Manual Diff     


 


Neutrophils % (Manual)     (46-82)  %


 


Band Neutrophils %     (0-6)  %


 


Lymphocytes % (Manual)     (13-37)  %


 


Monocytes % (Manual)     (4-12)  %


 


PT     (9.0-11.1)  sec


 


INR     (1.00-1.24)  


 


Sodium    131 L  (135-145)  mmol/L


 


Potassium    5.1  (3.5-5.3)  mmol/L


 


Chloride    96 L  (100-110)  mmol/L


 


Carbon Dioxide    21  (21-32)  mmol/L


 


BUN    95 H D  (7-18)  mg/dL


 


Creatinine    6.7 H*  (0.55-1.02)  mg/dL


 


Est Cr Clr Drug Dosing    5.12  mL/min


 


Estimated GFR (MDRD)    6 L  (>60)  


 


BUN/Creatinine Ratio    14.2  (9-20)  


 


Glucose    99  ()  mg/dL


 


Calcium    9.3  (8.6-10.2)  mg/dL


 


Magnesium     (1.8-2.5)  mg/dL


 


Total Bilirubin    0.2  (0.1-1.3)  mg/dL


 


AST    28 H D  (5-25)  IU/L


 


ALT    14  D  (12-36)  U/L


 


Alkaline Phosphatase    87  ()  IU/L


 


C-Reactive Protein     (0.5-0.9)  mg/dL


 


Total Protein    7.5  (6.0-8.0)  g/dL


 


Albumin    2.6 L  (3.2-4.6)  g/dL


 


Globulin    4.9  g/dL


 


Albumin/Globulin Ratio    0.5  


 


Urine Color  Yellow    (YELLOW)  


 


Urine Appearance  Clear    (CLEAR)  


 


Urine pH  5.0    (5.0-6.5)  


 


Ur Specific Gravity  1.025    (1.010-1.025)  


 


Urine Protein  Negative    (NEGATIVE)  mg/dL


 


Urine Glucose (UA)  Normal    (NORMAL)  mg/dL


 


Urine Ketones  Negative    (NEGATIVE)  mg/dL


 


Urine Occult Blood  Moderate H    (NEGATIVE)  


 


Urine Nitrite  Negative    (NEGATIVE)  


 


Urine Bilirubin  Small H    (NEGATIVE)  


 


Urine Urobilinogen  Normal    (NEGATIVE)  mg/dL


 


Ur Leukocyte Esterase  Negative    (NEGATIVE)  


 


Urine RBC  0-5    (0-5)  


 


Urine WBC  0-5    (0-5)  


 


Ur Squamous Epith Cells  Rare    (NS,R,O)  


 


Amorphous Sediment  Few    


 


Urine Bacteria  Rare H    (NS)  














  05/04/20 05/04/20 05/04/20 Range/Units





  15:10 15:10 15:10 


 


WBC     (4.5-12.0)  X10-3/uL


 


RBC     (3.23-5.20)  x10(6)uL


 


Hgb     (11.5-15.5)  g/dL


 


Hct     (30.0-51.3)  %


 


MCV     (80-96)  fL


 


MCH     (27.7-33.6)  pg


 


MCHC     (32.2-35.4)  g/dL


 


RDW     (11.5-15.5)  %


 


Plt Count     (125-369)  X10(3)uL


 


MPV     (7.4-10.4)  fL


 


Neut % (Auto)     (46-82)  %


 


Lymph % (Auto)     (13-37)  %


 


Mono % (Auto)     (4-12)  %


 


Eos % (Auto)     (1.0-5.0)  %


 


Baso % (Auto)     (0-2)  %


 


Neut # (Auto)     (1.6-8.3)  #


 


Lymph # (Auto)     (0.6-5.0)  #


 


Mono # (Auto)     (0.0-1.3)  #


 


Eos # (Auto)     (0.0-0.8)  #


 


Baso # (Auto)     (0.0-0.2)  #


 


Add Manual Diff     


 


Neutrophils % (Manual)     (46-82)  %


 


Band Neutrophils %     (0-6)  %


 


Lymphocytes % (Manual)     (13-37)  %


 


Monocytes % (Manual)     (4-12)  %


 


PT    51.4 H*  (9.0-11.1)  sec


 


INR    5.31 H*  (1.00-1.24)  


 


Sodium     (135-145)  mmol/L


 


Potassium     (3.5-5.3)  mmol/L


 


Chloride     (100-110)  mmol/L


 


Carbon Dioxide     (21-32)  mmol/L


 


BUN     (7-18)  mg/dL


 


Creatinine     (0.55-1.02)  mg/dL


 


Est Cr Clr Drug Dosing     mL/min


 


Estimated GFR (MDRD)     (>60)  


 


BUN/Creatinine Ratio     (9-20)  


 


Glucose     ()  mg/dL


 


Calcium     (8.6-10.2)  mg/dL


 


Magnesium   2.4   (1.8-2.5)  mg/dL


 


Total Bilirubin     (0.1-1.3)  mg/dL


 


AST     (5-25)  IU/L


 


ALT     (12-36)  U/L


 


Alkaline Phosphatase     ()  IU/L


 


C-Reactive Protein  20.2 H*    (0.5-0.9)  mg/dL


 


Total Protein     (6.0-8.0)  g/dL


 


Albumin     (3.2-4.6)  g/dL


 


Globulin     g/dL


 


Albumin/Globulin Ratio     


 


Urine Color     (YELLOW)  


 


Urine Appearance     (CLEAR)  


 


Urine pH     (5.0-6.5)  


 


Ur Specific Gravity     (1.010-1.025)  


 


Urine Protein     (NEGATIVE)  mg/dL


 


Urine Glucose (UA)     (NORMAL)  mg/dL


 


Urine Ketones     (NEGATIVE)  mg/dL


 


Urine Occult Blood     (NEGATIVE)  


 


Urine Nitrite     (NEGATIVE)  


 


Urine Bilirubin     (NEGATIVE)  


 


Urine Urobilinogen     (NEGATIVE)  mg/dL


 


Ur Leukocyte Esterase     (NEGATIVE)  


 


Urine RBC     (0-5)  


 


Urine WBC     (0-5)  


 


Ur Squamous Epith Cells     (NS,R,O)  


 


Amorphous Sediment     


 


Urine Bacteria     (NS)  














  05/05/20 05/05/20 05/05/20 Range/Units





  06:35 06:35 06:35 


 


WBC  8.4    (4.5-12.0)  X10-3/uL


 


RBC  2.86 L    (3.23-5.20)  x10(6)uL


 


Hgb  7.8 L    (11.5-15.5)  g/dL


 


Hct  25.0 L    (30.0-51.3)  %


 


MCV  87.4    (80-96)  fL


 


MCH  27.5 L    (27.7-33.6)  pg


 


MCHC  31.4 L    (32.2-35.4)  g/dL


 


RDW  20.1 H    (11.5-15.5)  %


 


Plt Count  244    (125-369)  X10(3)uL


 


MPV  8.2    (7.4-10.4)  fL


 


Neut % (Auto)     (46-82)  %


 


Lymph % (Auto)     (13-37)  %


 


Mono % (Auto)     (4-12)  %


 


Eos % (Auto)     (1.0-5.0)  %


 


Baso % (Auto)     (0-2)  %


 


Neut # (Auto)     (1.6-8.3)  #


 


Lymph # (Auto)     (0.6-5.0)  #


 


Mono # (Auto)     (0.0-1.3)  #


 


Eos # (Auto)     (0.0-0.8)  #


 


Baso # (Auto)     (0.0-0.2)  #


 


Add Manual Diff  Yes    


 


Neutrophils % (Manual)  74    (46-82)  %


 


Band Neutrophils %  5    (0-6)  %


 


Lymphocytes % (Manual)  15    (13-37)  %


 


Monocytes % (Manual)  6    (4-12)  %


 


PT   58.7 H*   (9.0-11.1)  sec


 


INR   6.12 H*   (1.00-1.24)  


 


Sodium    131 L  (135-145)  mmol/L


 


Potassium    5.1  (3.5-5.3)  mmol/L


 


Chloride    98 L  (100-110)  mmol/L


 


Carbon Dioxide    22  (21-32)  mmol/L


 


BUN    95 H  (7-18)  mg/dL


 


Creatinine    6.5 H*  (0.55-1.02)  mg/dL


 


Est Cr Clr Drug Dosing    5.28  mL/min


 


Estimated GFR (MDRD)    6 L  (>60)  


 


BUN/Creatinine Ratio    14.6  (9-20)  


 


Glucose    85  ()  mg/dL


 


Calcium    8.5 L  (8.6-10.2)  mg/dL


 


Magnesium     (1.8-2.5)  mg/dL


 


Total Bilirubin     (0.1-1.3)  mg/dL


 


AST     (5-25)  IU/L


 


ALT     (12-36)  U/L


 


Alkaline Phosphatase     ()  IU/L


 


C-Reactive Protein     (0.5-0.9)  mg/dL


 


Total Protein     (6.0-8.0)  g/dL


 


Albumin     (3.2-4.6)  g/dL


 


Globulin     g/dL


 


Albumin/Globulin Ratio     


 


Urine Color     (YELLOW)  


 


Urine Appearance     (CLEAR)  


 


Urine pH     (5.0-6.5)  


 


Ur Specific Gravity     (1.010-1.025)  


 


Urine Protein     (NEGATIVE)  mg/dL


 


Urine Glucose (UA)     (NORMAL)  mg/dL


 


Urine Ketones     (NEGATIVE)  mg/dL


 


Urine Occult Blood     (NEGATIVE)  


 


Urine Nitrite     (NEGATIVE)  


 


Urine Bilirubin     (NEGATIVE)  


 


Urine Urobilinogen     (NEGATIVE)  mg/dL


 


Ur Leukocyte Esterase     (NEGATIVE)  


 


Urine RBC     (0-5)  


 


Urine WBC     (0-5)  


 


Ur Squamous Epith Cells     (NS,R,O)  


 


Amorphous Sediment     


 


Urine Bacteria     (NS)  











Nathan Results Last 24 Hours: 


 Microbiology











 05/04/20 16:38 C. difficile Antigen & Toxins A,B - Final





 Stool / Feces 











Med Orders - Current: 


 Current Medications





Acetaminophen (Tylenol Extra Strength)  500 mg PO Q6H PRN


   PRN Reason: Pain


Artificial Tears (Liquitears 1.4% Ophth Soln)  0 ml EYEBOTH BEDTIME [facility]


   Last Admin: 05/04/20 21:40 Dose:  1 drop


Aspirin (Aspirin)  81 mg PO DAILY [facility]


Doxycycline Hyclate (Vibra-Tabs)  100 mg PO BID [facility]


Sodium Chloride (Normal Saline)  1,000 mls @ 75 mls/hr IV ASDIRECTED [facility]


   Last Admin: 05/05/20 05:45 Dose:  75 mls/hr


Sodium Chloride (Normal Saline)  1,000 mls @ 900 mls/hr IV ASDIRECTED [facility]


Ondansetron HCl (Zofran Odt)  4 mg PO Q6H PRN


   PRN Reason: nausea, able to take PO


Ondansetron HCl (Zofran)  4 mg IVPUSH Q6H PRN


   PRN Reason: Nausea/Vomiting


Paroxetine HCl (Paxil)  40 mg PO DAILY [facility]


Sodium Chloride (Saline Flush)  10 ml FLUSH ASDIRECTED PRN


   PRN Reason: Keep Vein Open


   Last Admin: 05/04/20 15:20 Dose:  10 ml





Discontinued Medications





Acetaminophen (Tylenol)  650 mg PO Q4H PRN


   PRN Reason: Pain (Mild 1-3)/fever


Sodium Chloride (Normal Saline)  500 mls @ 999 mls/hr IV .BOLUS ONE


   Stop: 05/04/20 15:21


   Last Admin: 05/04/20 15:20 Dose:  999 mls/hr











- Exam


General: Alert, Oriented


HEENT: Pupils Equal


Neck: Supple


Lungs: Clear to Auscultation


Cardiovascular: Regular Rate, Murmurs


Extremities: Normal Inspection


Skin: Warm


Neurological: No New Focal Deficit


Psy/Mental Status: Alert





Sepsis Event Note





- Evaluation


Sepsis Screening Result: No Definite Risk





- Focused Exam


Vital Signs: 


 Vital Signs











  Temp Pulse Resp BP Pulse Ox


 


 05/05/20 08:00  98.1 F  97  18  81/44 L  96


 


 05/05/20 03:04  98.2 F  74  16  82/46 L  93 L


 


 05/04/20 23:19  98 F  72  16  88/52 L  94 L











Date Exam was Performed: 05/05/20


Time Exam was Performed: 08:44





- Problem List & Annotations


(1) Acute renal failure


SNOMED Code(s): 25372521


   Code(s): N17.9 - ACUTE KIDNEY FAILURE, UNSPECIFIED   Status: Acute   Current 

Visit: Yes   


Qualifiers: 


   Acute renal failure type: unspecified   Qualified Code(s): N17.9 - Acute 

kidney failure, unspecified   





(2) Palliative care status


SNOMED Code(s): 106082287


   Code(s): Z51.5 - ENCOUNTER FOR PALLIATIVE CARE   Status: Acute   Current 

Visit: Yes   





(3) Chronic infection of left knee


SNOMED Code(s): 938274071, 832757735


   Code(s): M00.9 - PYOGENIC ARTHRITIS, UNSPECIFIED   Status: Chronic   Current 

Visit: Yes   





(4) Hypotension


SNOMED Code(s): 14873297


   Code(s): I95.9 - HYPOTENSION, UNSPECIFIED   Status: Acute   Current Visit: 

Yes   


Qualifiers: 


   Hypotension type: hypotension due to hypovolemia   Qualified Code(s): I95.89 

- Other hypotension; E86.1 - Hypovolemia   





(5) Severe dehydration


SNOMED Code(s): 007353049


   Code(s): E86.0 - DEHYDRATION   Status: Acute   Current Visit: Yes   





(6) Afib


SNOMED Code(s): 97634403


   Code(s): I48.91 - UNSPECIFIED ATRIAL FIBRILLATION   Status: Acute   Current 

Visit: No   


Qualifiers: 


   Atrial fibrillation type: paroxysmal   Qualified Code(s): I48.0 - Paroxysmal 

atrial fibrillation   





(7) Osteoarthritis


SNOMED Code(s): 807903441


   Code(s): M19.90 - UNSPECIFIED OSTEOARTHRITIS, UNSPECIFIED SITE   Status: 

Acute   Current Visit: No   





(8) Valvular heart disease


Status: Acute   Current Visit: No   





(9) CAD (coronary artery disease)


SNOMED Code(s): 73169619


   Code(s): I25.10 - ATHSCL HEART DISEASE OF NATIVE CORONARY ARTERY W/O ANG 

PCTRS   Status: Chronic   Current Visit: No   


Qualifiers: 


   Coronary Disease-Associated Artery/Lesion type: bypass graft   Native vs. 

transplanted heart: native heart   Associated angina: without angina   

Qualified Code(s): I25.810 - Atherosclerosis of coronary artery bypass graft(s) 

without angina pectoris   





(10) HTN (hypertension)


SNOMED Code(s): 79036039


   Code(s): I10 - ESSENTIAL (PRIMARY) HYPERTENSION   Status: Chronic   Current 

Visit: No   


Qualifiers: 


   Hypertension type: essential hypertension   Qualified Code(s): I10 - 

Essential (primary) hypertension   





- Problem List Review


Problem List Initiated/Reviewed/Updated: Yes





- My Orders


Last 24 Hours: 


My Active Orders





05/04/20 18:35


Acetaminophen [Tylenol Extra Strength]   500 mg PO Q6H PRN 





05/04/20 21:00


Polyvinyl Alcohol [LiquiTears 1.4% Ophth Soln]   0 ml EYEBOTH BEDTIME 





05/05/20 08:43


TYPE AND SCREEN [BBK] Routine 





05/05/20 08:45


Sodium Chloride 0.9% [Normal Saline] 1,000 ml IV ASDIRECTED 





05/05/20 09:00


Aspirin   81 mg PO DAILY 


Doxycycline [Vibra-Tabs]   100 mg PO BID 


PARoxetine [Paxil]   40 mg PO DAILY 





05/06/20 05:11


BASIC METABOLIC PANEL,BMP [CHEM] AM 


CBC WITH AUTO DIFF [HEME] AM 


PRO B-TYPE NATRIUR PEPT,BNPPRO [CHEM] DAILY 





05/07/20 05:11


PRO B-TYPE NATRIUR PEPT,BNPPRO [CHEM] DAILY 














- Plan


Plan:: 


Continue rehydration.Give one bolus,then increase fluids after to 125. repeat 

labs in AM.

## 2020-05-06 PROCEDURE — 30233N1 TRANSFUSION OF NONAUTOLOGOUS RED BLOOD CELLS INTO PERIPHERAL VEIN, PERCUTANEOUS APPROACH: ICD-10-PCS | Performed by: FAMILY MEDICINE

## 2020-05-06 RX ADMIN — Medication PRN ML: at 15:21

## 2020-05-06 RX ADMIN — Medication PRN ML: at 11:02

## 2020-05-06 RX ADMIN — DIPHENOXYLATE HYDROCHLORIDE AND ATROPINE SULFATE PRN TAB: 2.5; .025 TABLET ORAL at 20:09

## 2020-05-06 RX ADMIN — POLYVINYL ALCOHOL SCH DROP: 14 SOLUTION/ DROPS OPHTHALMIC at 20:06

## 2020-05-06 NOTE — PCM.PN
- General Info


Date of Service: 05/06/20


Subjective Update: 


Complains of lightheadedness when she gets up.Wound drainage increased. BP 

better.





- Review of Systems


General: Reports: No Symptoms


HEENT: Reports: No Symptoms


Pulmonary: Reports: No Symptoms


Cardiovascular: Reports: No Symptoms





- Patient Data


Vitals - Most Recent: 


 Last Vital Signs











Temp  97.4 F   05/05/20 23:00


 


Pulse  90   05/06/20 03:30


 


Resp  20   05/06/20 03:30


 


BP  102/56 L  05/06/20 03:30


 


Pulse Ox  98   05/06/20 03:30











Weight - Most Recent: 74.503 kg


I&O - Last 24 Hours: 


 Intake & Output











 05/05/20 05/06/20 05/06/20





 22:59 06:59 14:59


 


Intake Total 1523 980 


 


Output Total 500 500 


 


Balance 1023 480 











Lab Results Last 24 Hours: 


 Laboratory Results - last 24 hr











  05/05/20 05/06/20 05/06/20 Range/Units





  06:35 06:25 06:25 


 


WBC   7.4   (4.5-12.0)  X10-3/uL


 


RBC   2.81 L   (3.23-5.20)  x10(6)uL


 


Hgb   7.7 L   (11.5-15.5)  g/dL


 


Hct   24.3 L   (30.0-51.3)  %


 


MCV   86.6   (80-96)  fL


 


MCH   27.4 L   (27.7-33.6)  pg


 


MCHC   31.6 L   (32.2-35.4)  g/dL


 


RDW   19.5 H   (11.5-15.5)  %


 


Plt Count   249   (125-369)  X10(3)uL


 


MPV   8.3   (7.4-10.4)  fL


 


Neut % (Auto)   77.8   (46-82)  %


 


Lymph % (Auto)   11.7 L   (13-37)  %


 


Mono % (Auto)   7.4   (4-12)  %


 


Eos % (Auto)   3   (1.0-5.0)  %


 


Baso % (Auto)   1   (0-2)  %


 


Neut # (Auto)   5.8   (1.6-8.3)  #


 


Lymph # (Auto)   0.9   (0.6-5.0)  #


 


Mono # (Auto)   0.5   (0.0-1.3)  #


 


Eos # (Auto)   0.2   (0.0-0.8)  #


 


Baso # (Auto)   0.0   (0.0-0.2)  #


 


Sodium    133 L  (135-145)  mmol/L


 


Potassium    5.4 H  (3.5-5.3)  mmol/L


 


Chloride    103  D  (100-110)  mmol/L


 


Carbon Dioxide    20 L  (21-32)  mmol/L


 


BUN    80 H D  (7-18)  mg/dL


 


Creatinine    4.9 H*  (0.55-1.02)  mg/dL


 


Est Cr Clr Drug Dosing    7.00  mL/min


 


Estimated GFR (MDRD)    8 L  (>60)  


 


BUN/Creatinine Ratio    16.3  (9-20)  


 


Glucose    94  ()  mg/dL


 


Calcium    8.7  (8.6-10.2)  mg/dL


 


NT-Pro-B Natriuret Pep     (<=450)  pg/mL


 


Blood Type  A POSITIVE    


 


Gel Antibody Screen  Negative    


 


Crossmatch  See Detail    














  05/06/20 Range/Units





  06:25 


 


WBC   (4.5-12.0)  X10-3/uL


 


RBC   (3.23-5.20)  x10(6)uL


 


Hgb   (11.5-15.5)  g/dL


 


Hct   (30.0-51.3)  %


 


MCV   (80-96)  fL


 


MCH   (27.7-33.6)  pg


 


MCHC   (32.2-35.4)  g/dL


 


RDW   (11.5-15.5)  %


 


Plt Count   (125-369)  X10(3)uL


 


MPV   (7.4-10.4)  fL


 


Neut % (Auto)   (46-82)  %


 


Lymph % (Auto)   (13-37)  %


 


Mono % (Auto)   (4-12)  %


 


Eos % (Auto)   (1.0-5.0)  %


 


Baso % (Auto)   (0-2)  %


 


Neut # (Auto)   (1.6-8.3)  #


 


Lymph # (Auto)   (0.6-5.0)  #


 


Mono # (Auto)   (0.0-1.3)  #


 


Eos # (Auto)   (0.0-0.8)  #


 


Baso # (Auto)   (0.0-0.2)  #


 


Sodium   (135-145)  mmol/L


 


Potassium   (3.5-5.3)  mmol/L


 


Chloride   (100-110)  mmol/L


 


Carbon Dioxide   (21-32)  mmol/L


 


BUN   (7-18)  mg/dL


 


Creatinine   (0.55-1.02)  mg/dL


 


Est Cr Clr Drug Dosing   mL/min


 


Estimated GFR (MDRD)   (>60)  


 


BUN/Creatinine Ratio   (9-20)  


 


Glucose   ()  mg/dL


 


Calcium   (8.6-10.2)  mg/dL


 


NT-Pro-B Natriuret Pep  37462 H*  (<=450)  pg/mL


 


Blood Type   


 


Gel Antibody Screen   


 


Crossmatch   











Med Orders - Current: 


 Current Medications





Acetaminophen (Tylenol Extra Strength)  500 mg PO Q6H PRN


   PRN Reason: Pain


   Last Admin: 05/05/20 15:51 Dose:  500 mg


Artificial Tears (Liquitears 1.4% Ophth Soln)  0 ml EYEBOTH BEDTIME [facility]


   Last Admin: 05/05/20 20:24 Dose:  1 drop


Aspirin (Aspirin)  81 mg PO DAILY [facility]


   Last Admin: 05/06/20 08:20 Dose:  81 mg


Ceftriaxone Sodium (Rocephin)  2 gm IVPUSH Q24H [facility]


Diphenoxylate HCl/Atropine (Lomotil 0.025-2.5 Mg)  1 tab PO BID PRN


   PRN Reason: Diarrhea


   Last Admin: 05/05/20 15:49 Dose:  1 tab


Doxycycline Hyclate (Vibra-Tabs)  100 mg PO BID [facility]


   Last Admin: 05/06/20 08:21 Dose:  100 mg


Furosemide (Lasix)  20 mg IVPUSH NOW ONE


   Stop: 05/06/20 16:01


Sodium Chloride (Normal Saline)  1,000 mls @ 900 mls/hr IV ASDIRECTED [facility]


   Last Admin: 05/05/20 09:33 Dose:  900 mls/hr


Sodium Chloride (Normal Saline)  250 mls @ 100 mls/hr IV ASDIRECTED [facility]


Ondansetron HCl (Zofran Odt)  4 mg PO Q6H PRN


   PRN Reason: nausea, able to take PO


Ondansetron HCl (Zofran)  4 mg IVPUSH Q6H PRN


   PRN Reason: Nausea/Vomiting


Paroxetine HCl (Paxil)  40 mg PO DAILY [facility]


   Last Admin: 05/06/20 08:20 Dose:  40 mg


Sodium Chloride (Saline Flush)  10 ml FLUSH ASDIRECTED PRN


   PRN Reason: Keep Vein Open


   Last Admin: 05/05/20 23:37 Dose:  10 ml





Discontinued Medications





Acetaminophen (Tylenol)  650 mg PO Q4H PRN


   PRN Reason: Pain (Mild 1-3)/fever


Sodium Chloride (Normal Saline)  500 mls @ 999 mls/hr IV .BOLUS ONE


   Stop: 05/04/20 15:21


   Last Admin: 05/04/20 15:20 Dose:  999 mls/hr


Sodium Chloride (Normal Saline)  1,000 mls @ 75 mls/hr IV ASDIRECTED GEETHA


   Last Admin: 05/05/20 05:45 Dose:  75 mls/hr


Sodium Chloride (Normal Saline)  1,000 mls @ 75 mls/hr IV ASDIRECTED GEETHA


   Last Admin: 05/06/20 01:43 Dose:  150 mls/hr


Sodium Chloride (Normal Saline)  1,000 mls @ 75 mls/hr IV ASDIRECTED GEETHA











- Exam


General: Alert, Oriented


HEENT: Pupils Equal


Neck: Supple


Lungs: Rales


Cardiovascular: Regular Rate


Extremities: Pedal Edema, Joint Swelling





Sepsis Event Note





- Evaluation


Sepsis Screening Result: No Definite Risk





- Focused Exam


Vital Signs: 


 Vital Signs











  Temp Pulse Resp BP Pulse Ox


 


 05/06/20 03:30   90  20  102/56 L  98


 


 05/05/20 23:00  97.4 F  88  16  102/56 L  97











Date Exam was Performed: 05/06/20


Time Exam was Performed: 08:57





- Problem List & Annotations


(1) Acute renal failure


SNOMED Code(s): 60470728


   Code(s): N17.9 - ACUTE KIDNEY FAILURE, UNSPECIFIED   Status: Acute   Current 

Visit: Yes   


Qualifiers: 


   Acute renal failure type: unspecified   Qualified Code(s): N17.9 - Acute 

kidney failure, unspecified   





(2) Palliative care status


SNOMED Code(s): 417580198


   Code(s): Z51.5 - ENCOUNTER FOR PALLIATIVE CARE   Status: Acute   Current 

Visit: Yes   





(3) Chronic infection of left knee


SNOMED Code(s): 766027860, 375100972


   Code(s): M00.9 - PYOGENIC ARTHRITIS, UNSPECIFIED   Status: Chronic   Current 

Visit: Yes   





(4) Hypotension


SNOMED Code(s): 90302455


   Code(s): I95.9 - HYPOTENSION, UNSPECIFIED   Status: Acute   Current Visit: 

Yes   


Qualifiers: 


   Hypotension type: hypotension due to hypovolemia   Qualified Code(s): I95.89 

- Other hypotension; E86.1 - Hypovolemia   





(5) Severe dehydration


SNOMED Code(s): 813106341


   Code(s): E86.0 - DEHYDRATION   Status: Acute   Current Visit: Yes   





(6) Afib


SNOMED Code(s): 86795130


   Code(s): I48.91 - UNSPECIFIED ATRIAL FIBRILLATION   Status: Acute   Current 

Visit: No   


Qualifiers: 


   Atrial fibrillation type: paroxysmal   Qualified Code(s): I48.0 - Paroxysmal 

atrial fibrillation   





(7) Osteoarthritis


SNOMED Code(s): 953757452


   Code(s): M19.90 - UNSPECIFIED OSTEOARTHRITIS, UNSPECIFIED SITE   Status: 

Acute   Current Visit: No   





(8) Valvular heart disease


Status: Acute   Current Visit: No   





(9) CAD (coronary artery disease)


SNOMED Code(s): 51388546


   Code(s): I25.10 - ATHSCL HEART DISEASE OF NATIVE CORONARY ARTERY W/O ANG 

PCTRS   Status: Chronic   Current Visit: No   


Qualifiers: 


   Coronary Disease-Associated Artery/Lesion type: bypass graft   Native vs. 

transplanted heart: native heart   Associated angina: without angina   

Qualified Code(s): I25.810 - Atherosclerosis of coronary artery bypass graft(s) 

without angina pectoris   





(10) HTN (hypertension)


SNOMED Code(s): 01798782


   Code(s): I10 - ESSENTIAL (PRIMARY) HYPERTENSION   Status: Chronic   Current 

Visit: No   


Qualifiers: 


   Hypertension type: essential hypertension   Qualified Code(s): I10 - 

Essential (primary) hypertension   





- Problem List Review


Problem List Initiated/Reviewed/Updated: Yes





- My Orders


Last 24 Hours: 


My Active Orders





05/05/20 08:45


Sodium Chloride 0.9% [Normal Saline] 1,000 ml IV ASDIRECTED 





05/05/20 09:00


Aspirin   81 mg PO DAILY 


Doxycycline [Vibra-Tabs]   100 mg PO BID 


PARoxetine [Paxil]   40 mg PO DAILY 





05/05/20 15:25


Atropine/Diphenoxylate [Lomotil 0.025-2.5 MG]   1 tab PO BID PRN 





05/06/20 08:54


RED BLOOD CELLS LP [BBK] Urgent 


Transfuse Red Blood Cells [COMM] Urgent 





05/06/20 09:00


Sodium Chloride 0.9% [Normal Saline] 250 ml IV ASDIRECTED 


cefTRIAXone [Rocephin]   2 gm IVPUSH Q24H 





05/06/20 16:00


Furosemide [Lasix]   20 mg IVPUSH NOW ONE 





05/07/20 05:11


BASIC METABOLIC PANEL,BMP [CHEM] AM 


CBC WITH AUTO DIFF [HEME] AM 


PRO B-TYPE NATRIUR PEPT,BNPPRO [CHEM] DAILY 














- Plan


Plan:: 


I will add Rocephin,. Stop fluids. Transfuse 1 unit. Repeat Labs in Am,possible 

discharge to ortho appointment tomorrow

## 2020-05-07 RX ADMIN — Medication PRN ML: at 08:11

## 2020-05-07 NOTE — DISCH
DISCHARGE DATE:  05/07/2020

 

REASON FOR ADMISSION:

1. Acute renal failure.

2. Coronary artery disease.

3. Anemia.

4. Hypertension.

5. Atrial fibrillation.

6. Alteration of mental status.

 

BRIEF HISTORY AND HOSPITAL COURSE:  This is an 84-year-old female who came in

with weakness, hypertension, and was found to have low hemoglobin and high

creatinine of 6.6.  She was rehydrated and all her antihypertensives were

discontinued.  She has an infection of the left knee, for which she has an

orthopedic appointment.  She has improved somewhat with exception of mild

confusion.  Her creatinine is down to 3.2.  I did give her 1 unit of PRBC

because of the persistent dizziness and hemoglobin of 7.7; it has now come up to

8.6.  She has gained 10 pounds in the process of fluid and blood product

replacements.  As such, I will discharge her on home dose of Bumex and

spironolactone.  I discontinued the ACE inhibitor.  Her discharge INR is 2.25.

She may resume her home dose of Coumadin and also continue the metoprolol for

hypertension.  She should see the PCP tomorrow to discuss further tests of CBC,

CMP, and discuss her diuretics based on the findings.  She was on Bactrim upon

admission and culture probably grew MSSA.  I discharged her home on cephalexin.

Her creatinine was not able to tolerate Bactrim.

 

I spent more than 35 minutes in the discharge of the patient.

 

Job#: [id number]

DD: 05/07/2020 0826

DT: 05/07/2020 0924 TN/VANIA

## 2020-11-13 NOTE — CR
INDICATION:  Found on floor, question syncopal episode.



CHEST:  An AP wheelchair view of the chest obtained upright showed a very poor 
inspiration, emphasizing markings.  It was obtained on 10/03/19 and is compared 
with CT scan from 06/04/14.  Pleural parenchymal changes are noted at both lung 
bases, some of which may be atelectatic in nature; however, pneumonia at the 
lung bases and pleuritis cannot be excluded, especially on the right.  PA and 
lateral views of the chest with full inspiration may be helpful for further 
evaluation.  



The heart is enlarged.  The aorta is tortuous with calcification.  



Evidence of aortic valve replacement is noted with median sternotomy.  



Overlying EKG leads are noted.



IMPRESSION:  

1.  Bibasilar pleural parenchymal changes may represent pneumonia and 
pleuritis.  Full inspiration PA and lateral views of the chest recommended for 
further evaluation.

2.  ASHD with cardiomegaly.  Minimal, if any, CHF.

3.  Post aortic valve replacement.  



Report was given in person to Dr. Park immediately after the examination was 
completed on 10/03/19.

[facility]